# Patient Record
Sex: FEMALE | Race: BLACK OR AFRICAN AMERICAN | NOT HISPANIC OR LATINO | Employment: UNEMPLOYED | URBAN - METROPOLITAN AREA
[De-identification: names, ages, dates, MRNs, and addresses within clinical notes are randomized per-mention and may not be internally consistent; named-entity substitution may affect disease eponyms.]

---

## 2017-10-24 ENCOUNTER — HOSPITAL ENCOUNTER (EMERGENCY)
Facility: HOSPITAL | Age: 57
Discharge: HOME | End: 2017-10-25
Attending: EMERGENCY MEDICINE
Payer: MEDICAID

## 2017-10-24 DIAGNOSIS — N30.00 ACUTE CYSTITIS WITHOUT HEMATURIA: Primary | ICD-10-CM

## 2017-10-24 DIAGNOSIS — K42.9 UMBILICAL HERNIA WITHOUT OBSTRUCTION AND WITHOUT GANGRENE: ICD-10-CM

## 2017-10-24 LAB
ALBUMIN SERPL BCP-MCNC: 2.5 G/DL
ALP SERPL-CCNC: 100 U/L
ALT SERPL W/O P-5'-P-CCNC: 11 U/L
ANION GAP SERPL CALC-SCNC: 7 MMOL/L
AST SERPL-CCNC: 9 U/L
BASOPHILS # BLD AUTO: 0.02 K/UL
BASOPHILS NFR BLD: 0.2 %
BILIRUB SERPL-MCNC: 0.4 MG/DL
BUN SERPL-MCNC: 19 MG/DL
CALCIUM SERPL-MCNC: 9.3 MG/DL
CHLORIDE SERPL-SCNC: 106 MMOL/L
CO2 SERPL-SCNC: 28 MMOL/L
CREAT SERPL-MCNC: 1.2 MG/DL
DIFFERENTIAL METHOD: ABNORMAL
EOSINOPHIL # BLD AUTO: 0.2 K/UL
EOSINOPHIL NFR BLD: 2.3 %
ERYTHROCYTE [DISTWIDTH] IN BLOOD BY AUTOMATED COUNT: 14.7 %
EST. GFR  (AFRICAN AMERICAN): 59 ML/MIN/1.73 M^2
EST. GFR  (NON AFRICAN AMERICAN): 51 ML/MIN/1.73 M^2
GLUCOSE SERPL-MCNC: 98 MG/DL
HCT VFR BLD AUTO: 35.9 %
HGB BLD-MCNC: 11.6 G/DL
LIPASE SERPL-CCNC: 8 U/L
LYMPHOCYTES # BLD AUTO: 3.5 K/UL
LYMPHOCYTES NFR BLD: 35 %
MCH RBC QN AUTO: 28.7 PG
MCHC RBC AUTO-ENTMCNC: 32.3 G/DL
MCV RBC AUTO: 89 FL
MONOCYTES # BLD AUTO: 1 K/UL
MONOCYTES NFR BLD: 9.9 %
NEUTROPHILS # BLD AUTO: 5.2 K/UL
NEUTROPHILS NFR BLD: 52.4 %
PLATELET # BLD AUTO: 456 K/UL
PMV BLD AUTO: 10 FL
POTASSIUM SERPL-SCNC: 3.7 MMOL/L
PROT SERPL-MCNC: 8.2 G/DL
RBC # BLD AUTO: 4.04 M/UL
SODIUM SERPL-SCNC: 141 MMOL/L
WBC # BLD AUTO: 10 K/UL

## 2017-10-24 PROCEDURE — 81000 URINALYSIS NONAUTO W/SCOPE: CPT

## 2017-10-24 PROCEDURE — 85025 COMPLETE CBC W/AUTO DIFF WBC: CPT

## 2017-10-24 PROCEDURE — 63600175 PHARM REV CODE 636 W HCPCS: Performed by: EMERGENCY MEDICINE

## 2017-10-24 PROCEDURE — 87186 SC STD MICRODIL/AGAR DIL: CPT

## 2017-10-24 PROCEDURE — 99284 EMERGENCY DEPT VISIT MOD MDM: CPT | Mod: 25

## 2017-10-24 PROCEDURE — 80053 COMPREHEN METABOLIC PANEL: CPT

## 2017-10-24 PROCEDURE — 87077 CULTURE AEROBIC IDENTIFY: CPT

## 2017-10-24 PROCEDURE — 96374 THER/PROPH/DIAG INJ IV PUSH: CPT | Mod: 59

## 2017-10-24 PROCEDURE — 83690 ASSAY OF LIPASE: CPT

## 2017-10-24 PROCEDURE — 96375 TX/PRO/DX INJ NEW DRUG ADDON: CPT

## 2017-10-24 PROCEDURE — 87088 URINE BACTERIA CULTURE: CPT

## 2017-10-24 PROCEDURE — 87086 URINE CULTURE/COLONY COUNT: CPT

## 2017-10-24 PROCEDURE — 25500020 PHARM REV CODE 255: Performed by: EMERGENCY MEDICINE

## 2017-10-24 RX ORDER — MORPHINE SULFATE 10 MG/ML
5 INJECTION INTRAMUSCULAR; INTRAVENOUS; SUBCUTANEOUS ONCE
Status: COMPLETED | OUTPATIENT
Start: 2017-10-24 | End: 2017-10-24

## 2017-10-24 RX ADMIN — MORPHINE SULFATE 5 MG: 10 INJECTION INTRAVENOUS at 10:10

## 2017-10-24 RX ADMIN — IOHEXOL 15 ML: 300 INJECTION, SOLUTION INTRAVENOUS at 09:10

## 2017-10-24 RX ADMIN — IOHEXOL 70 ML: 350 INJECTION, SOLUTION INTRAVENOUS at 09:10

## 2017-10-25 VITALS
BODY MASS INDEX: 41.02 KG/M2 | HEART RATE: 98 BPM | OXYGEN SATURATION: 98 % | RESPIRATION RATE: 17 BRPM | DIASTOLIC BLOOD PRESSURE: 88 MMHG | WEIGHT: 293 LBS | SYSTOLIC BLOOD PRESSURE: 139 MMHG | HEIGHT: 71 IN

## 2017-10-25 LAB
BACTERIA #/AREA URNS HPF: ABNORMAL /HPF
BILIRUB UR QL STRIP: NEGATIVE
CLARITY UR: ABNORMAL
COLOR UR: YELLOW
GLUCOSE UR QL STRIP: NEGATIVE
HGB UR QL STRIP: ABNORMAL
KETONES UR QL STRIP: NEGATIVE
LEUKOCYTE ESTERASE UR QL STRIP: ABNORMAL
MICROSCOPIC COMMENT: ABNORMAL
NITRITE UR QL STRIP: NEGATIVE
PH UR STRIP: 5 [PH] (ref 5–8)
PROT UR QL STRIP: NEGATIVE
RBC #/AREA URNS HPF: 2 /HPF (ref 0–4)
SP GR UR STRIP: 1.03 (ref 1–1.03)
SQUAMOUS #/AREA URNS HPF: ABNORMAL /HPF
URN SPEC COLLECT METH UR: ABNORMAL
UROBILINOGEN UR STRIP-ACNC: ABNORMAL EU/DL
WBC #/AREA URNS HPF: >100 /HPF (ref 0–5)

## 2017-10-25 PROCEDURE — 25000003 PHARM REV CODE 250: Performed by: EMERGENCY MEDICINE

## 2017-10-25 PROCEDURE — 63600175 PHARM REV CODE 636 W HCPCS: Performed by: EMERGENCY MEDICINE

## 2017-10-25 RX ORDER — CEPHALEXIN 250 MG/1
500 CAPSULE ORAL
Status: COMPLETED | OUTPATIENT
Start: 2017-10-25 | End: 2017-10-25

## 2017-10-25 RX ORDER — CEPHALEXIN 500 MG/1
500 CAPSULE ORAL EVERY 8 HOURS
Qty: 21 CAPSULE | Refills: 0 | Status: SHIPPED | OUTPATIENT
Start: 2017-10-25 | End: 2017-11-01

## 2017-10-25 RX ORDER — ONDANSETRON 2 MG/ML
4 INJECTION INTRAMUSCULAR; INTRAVENOUS
Status: COMPLETED | OUTPATIENT
Start: 2017-10-25 | End: 2017-10-25

## 2017-10-25 RX ADMIN — CEPHALEXIN 500 MG: 250 CAPSULE ORAL at 12:10

## 2017-10-25 RX ADMIN — ONDANSETRON 4 MG: 2 INJECTION, SOLUTION INTRAMUSCULAR; INTRAVENOUS at 12:10

## 2017-10-25 NOTE — ED TRIAGE NOTES
Pt. Reports she has been having abd pain below her umbilicus. Pt. Tender on palpitation. Pt. Reports she has been hurting all over. She c/o emesis x3. Pt. Reports she has been having hard black stools today. Pain to abd. Is a 9/10 on the pain scale.

## 2017-10-25 NOTE — ED PROVIDER NOTES
"Encounter Date: 10/24/2017    SCRIBE #1 NOTE: I, Yeny Palmer, am scribing for, and in the presence of,  Valentin Carpenter MD. I have scribed the following portions of the note - Other sections scribed: HPI, ROS.       History     Chief Complaint   Patient presents with    Abdominal Pain     with emesis and reports "knot to abd". c/o dark stool as well.      CC: Abdominal Pain    HPI: 57 year old female with HTN and acid reflux presents to the ED c/o periumbilical abdominal pain that radiates to her back with associated nausea, vomiting (x 3), and hard, dark stool that worsened today. Pain is 9/10. Pain is exacerbated with movement. Pt otherwise denies fever, chills, chest pain, SOB, dysuria, and any other associated symptoms. No alleviating factors.      The history is provided by the patient. No  was used.     Review of patient's allergies indicates:  No Known Allergies  Past Medical History:   Diagnosis Date    Acid reflux     Arthritis     Hypertension      Past Surgical History:   Procedure Laterality Date    FRACTURE SURGERY      HYSTERECTOMY      TUBAL LIGATION       Family History   Problem Relation Age of Onset    Hypertension Brother      Social History   Substance Use Topics    Smoking status: Never Smoker    Smokeless tobacco: Never Used    Alcohol use No     Review of Systems   Constitutional: Negative for chills, diaphoresis and fever.   HENT: Negative for ear pain and sore throat.    Eyes: Negative for photophobia and visual disturbance.   Respiratory: Negative for cough and shortness of breath.    Cardiovascular: Negative for chest pain.   Gastrointestinal: Positive for abdominal pain (periumbilical), nausea and vomiting. Negative for diarrhea.        (+) hard, dark stool   Genitourinary: Negative for dysuria.   Musculoskeletal: Negative for back pain.   Skin: Negative for rash.   Neurological: Negative for headaches.       Physical Exam     Initial Vitals [10/24/17 " 2022]   BP Pulse Resp Temp SpO2   (!) 165/96 104 (!) 22 -- 98 %      MAP       119         Physical Exam    Vitals reviewed.  Constitutional: She appears well-developed and well-nourished.   HENT:   Head: Normocephalic and atraumatic.   Nose: Nose normal.   Mouth/Throat: No oropharyngeal exudate.   Eyes: EOM are normal. Pupils are equal, round, and reactive to light.   Neck: Normal range of motion. Neck supple. No JVD present.   Cardiovascular: Regular rhythm and normal heart sounds. Exam reveals no gallop and no friction rub.    No murmur heard.  Pulmonary/Chest: Breath sounds normal. No stridor. No respiratory distress. She has no wheezes. She has no rhonchi. She has no rales. She exhibits no tenderness.   Abdominal: Soft. Bowel sounds are normal. She exhibits no distension and no mass. There is tenderness in the suprapubic area. There is no rebound and no guarding. A hernia is present. Hernia confirmed positive in the ventral area.       Musculoskeletal: Normal range of motion. She exhibits no edema or tenderness.   Neurological: She is alert and oriented to person, place, and time. She has normal strength. No sensory deficit.   Skin: Skin is warm and dry.   Psychiatric: She has a normal mood and affect. Thought content normal.         ED Course   Procedures  Labs Reviewed   CBC W/ AUTO DIFFERENTIAL - Abnormal; Notable for the following:        Result Value    Hemoglobin 11.6 (*)     Hematocrit 35.9 (*)     RDW 14.7 (*)     Platelets 456 (*)     All other components within normal limits   COMPREHENSIVE METABOLIC PANEL - Abnormal; Notable for the following:     Albumin 2.5 (*)     AST 9 (*)     Anion Gap 7 (*)     eGFR if  59 (*)     eGFR if non  51 (*)     All other components within normal limits   URINALYSIS - Abnormal; Notable for the following:     Appearance, UA Hazy (*)     Occult Blood UA 1+ (*)     Urobilinogen, UA 4.0-6.0 (*)     Leukocytes, UA 2+ (*)     All other  components within normal limits   URINALYSIS MICROSCOPIC - Abnormal; Notable for the following:     WBC, UA >100 (*)     Bacteria, UA Many (*)     All other components within normal limits   CULTURE, URINE   LIPASE             Medical Decision Making:   History:   Old Medical Records: I decided to obtain old medical records.        This is an emergent evaluation of the patient complaining of abdominal pain.  My differential diagnosis includes mesenteric ischemia, obstruction, appendicitis, pancreatitis, cholecystitis, peptic ulcer disease,diverticulitis, colitis, volvulus, hernia, UTI, gastritis and gastroenteritis.    I decided to obtain and review the old medical record.    The patient does not clinically have gastroenteritis.  A CT scan was performed and was negative for mesenteric ischemia, obstruction, appendicitis, cholecystitis, diverticulitis, colitis.    Clinically the patient has a large umbilical hernia.  This hernia was identified on CT scan without evidence of obstruction or sacculation.  Patient can be referred to outpatient management with general surgery.    The patient's urine is infected.  A urine culture is pending.  Administered.  First dose of Keflex in the emergency department.  The patient's labs are otherwise normal.    In the emergency department the patient was treated with:   Medications   ondansetron injection 4 mg (not administered)   cephALEXin capsule 500 mg (not administered)   morphine injection 5 mg (5 mg Intravenous Given 10/24/17 2221)   omnipaque 300 iohexol 15 mL (15 mLs Oral Given 10/24/17 2159)   omnipaque 350 iohexol 70 mL (70 mLs Intravenous Given 10/24/17 2158)         The patient reports that symptomatically, symptoms have improved.    The patient will be discharged home with the following medications:  Patient will follow up with primary care physician.    The results and physical exam findings were reviewed with the patient. Pt agrees with assessment, disposition and  treatment plan and has no further questions or complaints at this time.      OLGA Carpenter M.D. 12:22 AM 10/25/2017           Scribe Attestation:   Scribe #1: I performed the above scribed service and the documentation accurately describes the services I performed. I attest to the accuracy of the note.    Attending Attestation:           Physician Attestation for Scribe:  Physician Attestation Statement for Scribe #1: I, Valentin Carpenter MD, reviewed documentation, as scribed by Yeny Palmer in my presence, and it is both accurate and complete.                 ED Course      Clinical Impression:   The primary encounter diagnosis was Acute cystitis without hematuria. A diagnosis of Umbilical hernia without obstruction and without gangrene was also pertinent to this visit.                           Valentin Carpenter MD  10/25/17 0023

## 2017-10-27 LAB — BACTERIA UR CULT: NORMAL

## 2017-12-12 ENCOUNTER — HOSPITAL ENCOUNTER (OUTPATIENT)
Dept: PREADMISSION TESTING | Facility: HOSPITAL | Age: 57
Discharge: HOME | End: 2017-12-12
Attending: SURGERY
Payer: MEDICAID

## 2017-12-12 ENCOUNTER — HOSPITAL ENCOUNTER (EMERGENCY)
Facility: HOSPITAL | Age: 57
Discharge: HOME | End: 2017-12-12
Payer: MEDICAID

## 2017-12-12 VITALS
BODY MASS INDEX: 43.4 KG/M2 | TEMPERATURE: 98 F | WEIGHT: 293 LBS | HEART RATE: 92 BPM | SYSTOLIC BLOOD PRESSURE: 138 MMHG | HEIGHT: 69 IN | OXYGEN SATURATION: 96 % | RESPIRATION RATE: 18 BRPM | DIASTOLIC BLOOD PRESSURE: 88 MMHG

## 2017-12-12 VITALS
RESPIRATION RATE: 22 BRPM | TEMPERATURE: 98 F | BODY MASS INDEX: 41.02 KG/M2 | DIASTOLIC BLOOD PRESSURE: 90 MMHG | HEART RATE: 102 BPM | SYSTOLIC BLOOD PRESSURE: 164 MMHG | WEIGHT: 293 LBS | HEIGHT: 71 IN | OXYGEN SATURATION: 98 %

## 2017-12-12 DIAGNOSIS — M54.50 LOW BACK PAIN: ICD-10-CM

## 2017-12-12 DIAGNOSIS — K42.9 UMBILICAL HERNIA WITHOUT OBSTRUCTION AND WITHOUT GANGRENE: ICD-10-CM

## 2017-12-12 DIAGNOSIS — Z01.818 PRE-OP TESTING: Primary | ICD-10-CM

## 2017-12-12 LAB
ANION GAP SERPL CALC-SCNC: 8 MMOL/L
BASOPHILS # BLD AUTO: 0.02 K/UL
BASOPHILS NFR BLD: 0.3 %
BUN SERPL-MCNC: 10 MG/DL
CALCIUM SERPL-MCNC: 9.6 MG/DL
CHLORIDE SERPL-SCNC: 106 MMOL/L
CO2 SERPL-SCNC: 26 MMOL/L
CREAT SERPL-MCNC: 1 MG/DL
DIFFERENTIAL METHOD: ABNORMAL
EOSINOPHIL # BLD AUTO: 0.2 K/UL
EOSINOPHIL NFR BLD: 3 %
ERYTHROCYTE [DISTWIDTH] IN BLOOD BY AUTOMATED COUNT: 15 %
EST. GFR  (AFRICAN AMERICAN): >60 ML/MIN/1.73 M^2
EST. GFR  (NON AFRICAN AMERICAN): >60 ML/MIN/1.73 M^2
GLUCOSE SERPL-MCNC: 95 MG/DL
HCT VFR BLD AUTO: 36.2 %
HGB BLD-MCNC: 11.7 G/DL
LYMPHOCYTES # BLD AUTO: 2.2 K/UL
LYMPHOCYTES NFR BLD: 30.8 %
MCH RBC QN AUTO: 28.5 PG
MCHC RBC AUTO-ENTMCNC: 32.3 G/DL
MCV RBC AUTO: 88 FL
MONOCYTES # BLD AUTO: 0.6 K/UL
MONOCYTES NFR BLD: 8.9 %
NEUTROPHILS # BLD AUTO: 4.1 K/UL
NEUTROPHILS NFR BLD: 57 %
PLATELET # BLD AUTO: 398 K/UL
PMV BLD AUTO: 9.1 FL
POTASSIUM SERPL-SCNC: 3.7 MMOL/L
RBC # BLD AUTO: 4.11 M/UL
SODIUM SERPL-SCNC: 140 MMOL/L
WBC # BLD AUTO: 7.1 K/UL

## 2017-12-12 PROCEDURE — 99283 EMERGENCY DEPT VISIT LOW MDM: CPT | Mod: 25

## 2017-12-12 PROCEDURE — 80048 BASIC METABOLIC PNL TOTAL CA: CPT

## 2017-12-12 PROCEDURE — 93010 ELECTROCARDIOGRAM REPORT: CPT | Mod: ,,, | Performed by: INTERNAL MEDICINE

## 2017-12-12 PROCEDURE — 63600175 PHARM REV CODE 636 W HCPCS: Performed by: NURSE PRACTITIONER

## 2017-12-12 PROCEDURE — 36415 COLL VENOUS BLD VENIPUNCTURE: CPT

## 2017-12-12 PROCEDURE — 25000003 PHARM REV CODE 250: Performed by: NURSE PRACTITIONER

## 2017-12-12 PROCEDURE — 85025 COMPLETE CBC W/AUTO DIFF WBC: CPT

## 2017-12-12 PROCEDURE — 96372 THER/PROPH/DIAG INJ SC/IM: CPT

## 2017-12-12 PROCEDURE — 93005 ELECTROCARDIOGRAM TRACING: CPT

## 2017-12-12 RX ORDER — METHOCARBAMOL 500 MG/1
500 TABLET, FILM COATED ORAL
Status: COMPLETED | OUTPATIENT
Start: 2017-12-12 | End: 2017-12-12

## 2017-12-12 RX ORDER — OXYCODONE AND ACETAMINOPHEN 5; 325 MG/1; MG/1
1 TABLET ORAL
Status: COMPLETED | OUTPATIENT
Start: 2017-12-12 | End: 2017-12-12

## 2017-12-12 RX ORDER — METHOCARBAMOL 500 MG/1
1000 TABLET, FILM COATED ORAL 3 TIMES DAILY
Qty: 20 TABLET | Refills: 0 | Status: SHIPPED | OUTPATIENT
Start: 2017-12-12 | End: 2017-12-17

## 2017-12-12 RX ORDER — KETOROLAC TROMETHAMINE 30 MG/ML
15 INJECTION, SOLUTION INTRAMUSCULAR; INTRAVENOUS
Status: COMPLETED | OUTPATIENT
Start: 2017-12-12 | End: 2017-12-12

## 2017-12-12 RX ORDER — LIDOCAINE 50 MG/G
1 PATCH TOPICAL DAILY
Qty: 5 PATCH | Refills: 0 | Status: SHIPPED | OUTPATIENT
Start: 2017-12-12

## 2017-12-12 RX ADMIN — OXYCODONE HYDROCHLORIDE AND ACETAMINOPHEN 1 TABLET: 5; 325 TABLET ORAL at 10:12

## 2017-12-12 RX ADMIN — METHOCARBAMOL 500 MG: 500 TABLET ORAL at 09:12

## 2017-12-12 RX ADMIN — KETOROLAC TROMETHAMINE 15 MG: 30 INJECTION, SOLUTION INTRAMUSCULAR at 09:12

## 2017-12-12 NOTE — DISCHARGE INSTRUCTIONS
Your surgery is scheduled for___12/19/2017______________.    Call [phone] between 2 pm and 5 pm ___12/18/2017_________ to find out your arrival time for the day of surgery.    Report to SAME DAY SURGERY UNIT at _______am on the 2nd floor of the hospital.  Use the front entrance of the hospital before 6 am.  If you need wheelchair assistance, call [phone] from your cell phone,  or call 0 from the courtesy phone in the hospital lobby.    Important instructions:   Do not eat or drink after 12 midnight, including water.  It is okay to brush your teeth.  Do not have gum, candy or mints.     Take only these medications with a small swallow of water on the morning of your surgery.___none__________       Please shower the night before and the morning of your surgery.       Use Hibiclens soap to your surgery site if instructed by your pre op nurse.   If your surgery is on your abdomen, be sure to wash your naval.  Be sure to rinse off Hibiclens after it is on your skin for several minutes.  Do not use Hibiclens on your face or genitals.      Do not wear make- up, including mascara.     You may wear deodorant only.      Do not wear powder, body lotion or cologne.     Do not wear any jewelry or have any metal on your body.     Please bring any documents given to you by your doctor.     If you are going home on the same day of surgery, you must have arrangements for a ride home.  You will not be able to drive home if you were given anesthesia or sedation.     Stop taking Aspirin, Ibuprofen, Motrin and Aleve at least 7 days before your surgery. You may use Tylenol.     Stop taking fish oil and vitamin E for least 7 days before surgery.     Wear loose fitting clothes allowing for bandages.     Please leave money and valuables home.       You may bring your cell phone.     Call the doctor if fever or illness should occur before your surgery.    Call [phone] to contact us here at [facility] if needed.

## 2017-12-13 NOTE — DISCHARGE INSTRUCTIONS
You have been prescribed ROBAXIN for pain. Please do not take this medication while working, drinking alcohol, swimming, or while driving/operating heavy machinery. This medication may cause drowsiness, impair judgment, and reduce physical capabilities.    Please return to the Emergency Department for any new or worsening symptoms including: worsening pain, bowel or bladder incontinence, numbness or tingling in your groin, fever, chest pain, shortness of breath, loss of consciousness, dizziness, weakness, or any other concerns.     Please follow up with your Primary Care Provider within in the week. If you do not have one, you may contact the one listed on your discharge paperwork or you may also call the [facility] Appointment Desk at [phone] to schedule an appointment with one.     Please take all medication as prescribed.

## 2017-12-13 NOTE — ED PROVIDER NOTES
"Encounter Date: 12/12/2017       History     Chief Complaint   Patient presents with    Leg Pain     R leg pain "I'm hurting from my buttocks all the way to my ankle." Started Friday.      CC: Leg pain    HPI: Patient is a 57-year-old morbidly obese female who presents today her daughter for right leg pain since Friday.  The pain starts at her right low back and radiates down to her ankle.  The pain is constant, rated 10 out of 10 on pain scale.  She has tried ice which has not helped with the pain.  She denies any known trauma or falls.  She does report history of arthritis for which she takes Mobic daily.  She uses a cane and walker to ambulate.  Denies history of blood clots, stroke, recent long car or plane rides, recent surgeries, hormonal use.  She denies any bowel or bladder incontinence, numbness or tingling to her groin, fever, chills, shortness of breath, chest pain, abdominal pain, dysuria, increased urinary urgency or frequency.       The history is provided by the patient. No  was used.     Review of patient's allergies indicates:  No Known Allergies  Past Medical History:   Diagnosis Date    Acid reflux     Arthritis     Hypertension     Morbid obesity      Past Surgical History:   Procedure Laterality Date    FRACTURE SURGERY      HYSTERECTOMY      TUBAL LIGATION       Family History   Problem Relation Age of Onset    Hypertension Brother      Social History   Substance Use Topics    Smoking status: Never Smoker    Smokeless tobacco: Never Used    Alcohol use No     Review of Systems   Constitutional: Negative for chills and fever.   HENT: Negative for sore throat.    Respiratory: Negative for cough, choking, chest tightness, shortness of breath and wheezing.    Cardiovascular: Negative for chest pain, palpitations and leg swelling.   Gastrointestinal: Negative for abdominal distention, abdominal pain, diarrhea, nausea and vomiting.   Genitourinary: Negative for " decreased urine volume, difficulty urinating, dysuria, flank pain, frequency, pelvic pain and urgency.   Musculoskeletal: Positive for arthralgias, back pain, gait problem and myalgias. Negative for joint swelling, neck pain and neck stiffness.   Skin: Negative for rash and wound.   Neurological: Negative for dizziness, weakness, light-headedness, numbness and headaches.   Hematological: Does not bruise/bleed easily.   Psychiatric/Behavioral: Negative for confusion.       Physical Exam     Initial Vitals [12/12/17 1945]   BP Pulse Resp Temp SpO2   (!) 141/85 97 20 97.3 °F (36.3 °C) 96 %      MAP       103.67         Physical Exam    Constitutional: She appears well-developed and well-nourished. She is not diaphoretic. She is Obese . No distress.   HENT:   Head: Normocephalic and atraumatic.   Neck: Normal range of motion and full passive range of motion without pain. Neck supple. No spinous process tenderness and no muscular tenderness present. No neck rigidity.   Cardiovascular: Normal rate, regular rhythm, normal heart sounds and intact distal pulses. Exam reveals no gallop and no friction rub.    No murmur heard.  Pulmonary/Chest: Effort normal and breath sounds normal. No respiratory distress. She has no wheezes. She has no rhonchi. She has no rales. She exhibits no tenderness.   Abdominal: Soft. Normal appearance and bowel sounds are normal. There is no hepatosplenomegaly. There is no tenderness. There is no rigidity, no rebound, no guarding, no CVA tenderness, no tenderness at McBurney's point and negative Fajardo's sign.   Musculoskeletal: Normal range of motion.        Right hip: Normal. She exhibits normal range of motion, normal strength, no tenderness, no bony tenderness, no swelling, no crepitus, no deformity and no laceration.        Right knee: Normal. She exhibits normal range of motion, no swelling, no effusion, no ecchymosis, no deformity, no laceration, no erythema, normal alignment, no LCL laxity,  normal patellar mobility, no bony tenderness, normal meniscus and no MCL laxity. No tenderness found.        Right ankle: Normal. She exhibits normal range of motion, no swelling, no ecchymosis, no deformity, no laceration and normal pulse. No tenderness.        Lumbar back: Normal. She exhibits normal range of motion, no tenderness, no bony tenderness, no swelling, no edema, no deformity, no laceration, no pain, no spasm and normal pulse.        Right upper leg: She exhibits no tenderness, no bony tenderness, no swelling, no edema, no deformity and no laceration.        Right lower leg: Normal. She exhibits no tenderness, no bony tenderness, no swelling, no edema, no deformity and no laceration.   Neurological: She is alert and oriented to person, place, and time.   Skin: Skin is warm and dry.   Psychiatric: She has a normal mood and affect. Her behavior is normal.         ED Course   Procedures  Labs Reviewed - No data to display          Medical Decision Making:   ED Management:  This is an evaluation of a 57 y.o. female that presents to the Emergency Department for back pain.  Denies fever, rash, night sweats, weight loss, dysuria, bowel/bladder incontinence, or IV\SQ drug use. The patient is a non-toxic, afebrile, and well appearing female.  She is morbidly obese. On physical exam, there is no muscle tenderness to palpation, no mass or spasm, no point tenderness, step-off or deformity of the bony cervical, thoracic, or lumbar sacral spine to firm palpation at the midline. Patient has normal flexion, extension, lateral bending and rotation without limitation or pain. Dorsi and plantarflexion with adequate strength.  Patellar reflexes are equal and brisk bilaterally. The right leg is without deformity or asymmetry when compared to the left.  There is no soft tissue swelling or edema.  No overlying erythema, warmth, discoloration.  No lesions or break in skin integrity.  Soft tissues of the posterior lower leg are  soft, supple, nontender with no palpable cords or evidence of thrombophlebitis.  No evidence of compartment syndrome.  Medial thigh is without soft tissue swelling or tenderness to palpation.  There is no abdominal pain to palpation, CVA tenderness, saddle anesthesia. Reflexes and sensation are symmetric bilaterally. Dorsalis pedis pulses are symmetrical.     Vital signs reassuring. RESULTS:   X-ray of the lumbar spine AP and lateral disc space height loss and endplate degenerative changes involving L4-L5 and L5-S1.  He is multilevel facet arthropathy merrily involving L3-S1.  No acute displaced fracture or dislocation of the lumbar spine.    Patient reported some pain relief after Toradol, Robaxin, and Percocet administration.  I believe it is appropriate time to discharge patient home with a short course of muscle relaxer and lidocaine patches until she can follow up with orthopedics or her primary care doctor.      Given the above findings, my overall impression is back pain and right leg pain.  I believe this right leg pain is sciatic in nature. Given the above findings, I do not think the patient has acute vertebral fracture, subluxation, dislocation, epidural abscess, cauda equina, shingles, DVT, or UTI.    ED Course: Toradol, Robaxin, Percocet. D/C Meds: Robaxin, lidocaine patch. Additional D/C Information: Rice therapy. The diagnosis, treatment plan, instructions for follow-up and reevaluation with PCP, ORTHO as well as ED return precautions were discussed and understanding was verbalized. All questions or concerns have been addressed.     This case was discussed with Dr. Collazo who is in agreement with my assessment and plan.   .    Additional MDM:     Well's Criteria Score:  -Clinical symptoms of DVT (leg swelling, pain with palpation) = 0.0  -Other diagnosis less likely than pulmonary embolism =            0.0  -Heart Rate >100 =   0.0  -Immobilization (= or > than 3 days) or surgery in the previous 4  weeks = 0.0  -Previous DVT/PE = 0.0  -Hemoptysis =          0.0  -Malignancy =           0.0  Well's Probability Score =    0                      ED Course      Clinical Impression:   The encounter diagnosis was Low back pain.    Disposition:   Disposition: Discharged  Condition: Stable                        Sonali Cox NP  12/12/17 9240

## 2017-12-13 NOTE — ED TRIAGE NOTES
Pt reports pain from Rt buttocks down to the ankle.  Pt reports pain began last Friday.  Denies any trauma to the RLE.  Pt rates pain as 10.

## 2017-12-19 ENCOUNTER — SURGERY (OUTPATIENT)
Age: 57
End: 2017-12-19

## 2017-12-19 ENCOUNTER — HOSPITAL ENCOUNTER (OUTPATIENT)
Facility: HOSPITAL | Age: 57
Discharge: HOME | End: 2017-12-19
Attending: SURGERY | Admitting: SURGERY
Payer: MEDICAID

## 2017-12-19 ENCOUNTER — ANESTHESIA EVENT (OUTPATIENT)
Dept: SURGERY | Facility: HOSPITAL | Age: 57
End: 2017-12-19
Payer: MEDICAID

## 2017-12-19 ENCOUNTER — ANESTHESIA (OUTPATIENT)
Dept: SURGERY | Facility: HOSPITAL | Age: 57
End: 2017-12-19
Payer: MEDICAID

## 2017-12-19 VITALS
OXYGEN SATURATION: 98 % | TEMPERATURE: 97 F | DIASTOLIC BLOOD PRESSURE: 70 MMHG | HEART RATE: 85 BPM | WEIGHT: 293 LBS | RESPIRATION RATE: 14 BRPM | SYSTOLIC BLOOD PRESSURE: 162 MMHG | HEIGHT: 71 IN | BODY MASS INDEX: 41.02 KG/M2

## 2017-12-19 DIAGNOSIS — K42.9 UMBILICAL HERNIA WITHOUT OBSTRUCTION AND WITHOUT GANGRENE: Primary | ICD-10-CM

## 2017-12-19 LAB — POCT GLUCOSE: 84 MG/DL (ref 70–110)

## 2017-12-19 PROCEDURE — 25000003 PHARM REV CODE 250: Performed by: SURGERY

## 2017-12-19 PROCEDURE — 88302 TISSUE EXAM BY PATHOLOGIST: CPT | Mod: 26,,, | Performed by: PATHOLOGY

## 2017-12-19 PROCEDURE — 25000003 PHARM REV CODE 250: Performed by: ANESTHESIOLOGY

## 2017-12-19 PROCEDURE — 36000707: Performed by: SURGERY

## 2017-12-19 PROCEDURE — 25000242 PHARM REV CODE 250 ALT 637 W/ HCPCS

## 2017-12-19 PROCEDURE — 27201423 OPTIME MED/SURG SUP & DEVICES STERILE SUPPLY: Performed by: SURGERY

## 2017-12-19 PROCEDURE — D9220A PRA ANESTHESIA: Mod: CRNA,,, | Performed by: NURSE ANESTHETIST, CERTIFIED REGISTERED

## 2017-12-19 PROCEDURE — 36000706: Performed by: SURGERY

## 2017-12-19 PROCEDURE — 71000016 HC POSTOP RECOV ADDL HR: Performed by: SURGERY

## 2017-12-19 PROCEDURE — D9220A PRA ANESTHESIA: Mod: ANES,,, | Performed by: ANESTHESIOLOGY

## 2017-12-19 PROCEDURE — 88302 TISSUE EXAM BY PATHOLOGIST: CPT | Performed by: PATHOLOGY

## 2017-12-19 PROCEDURE — 63600175 PHARM REV CODE 636 W HCPCS: Performed by: ANESTHESIOLOGY

## 2017-12-19 PROCEDURE — C9290 INJ, BUPIVACAINE LIPOSOME: HCPCS | Performed by: SURGERY

## 2017-12-19 PROCEDURE — 63600175 PHARM REV CODE 636 W HCPCS: Performed by: SURGERY

## 2017-12-19 PROCEDURE — 71000039 HC RECOVERY, EACH ADD'L HOUR: Performed by: SURGERY

## 2017-12-19 PROCEDURE — 25000003 PHARM REV CODE 250

## 2017-12-19 PROCEDURE — 37000008 HC ANESTHESIA 1ST 15 MINUTES: Performed by: SURGERY

## 2017-12-19 PROCEDURE — S0028 INJECTION, FAMOTIDINE, 20 MG: HCPCS

## 2017-12-19 PROCEDURE — 88304 TISSUE EXAM BY PATHOLOGIST: CPT | Mod: 26,,, | Performed by: PATHOLOGY

## 2017-12-19 PROCEDURE — S0020 INJECTION, BUPIVICAINE HYDRO: HCPCS | Performed by: SURGERY

## 2017-12-19 PROCEDURE — 71000015 HC POSTOP RECOV 1ST HR: Performed by: SURGERY

## 2017-12-19 PROCEDURE — 71000033 HC RECOVERY, INTIAL HOUR: Performed by: SURGERY

## 2017-12-19 PROCEDURE — 88304 TISSUE EXAM BY PATHOLOGIST: CPT | Performed by: PATHOLOGY

## 2017-12-19 PROCEDURE — 63600175 PHARM REV CODE 636 W HCPCS

## 2017-12-19 PROCEDURE — 37000009 HC ANESTHESIA EA ADD 15 MINS: Performed by: SURGERY

## 2017-12-19 RX ORDER — LABETALOL HYDROCHLORIDE 5 MG/ML
INJECTION, SOLUTION INTRAVENOUS
Status: DISCONTINUED | OUTPATIENT
Start: 2017-12-19 | End: 2017-12-19

## 2017-12-19 RX ORDER — HYDROCODONE BITARTRATE AND ACETAMINOPHEN 5; 325 MG/1; MG/1
1 TABLET ORAL EVERY 4 HOURS PRN
Status: DISCONTINUED | OUTPATIENT
Start: 2017-12-19 | End: 2017-12-19 | Stop reason: HOSPADM

## 2017-12-19 RX ORDER — OXYCODONE AND ACETAMINOPHEN 5; 325 MG/1; MG/1
1 TABLET ORAL EVERY 4 HOURS PRN
Qty: 30 TABLET | Refills: 0 | Status: SHIPPED | OUTPATIENT
Start: 2017-12-19 | End: 2017-12-27

## 2017-12-19 RX ORDER — MORPHINE SULFATE 10 MG/ML
3 INJECTION INTRAMUSCULAR; INTRAVENOUS; SUBCUTANEOUS
Status: DISCONTINUED | OUTPATIENT
Start: 2017-12-19 | End: 2017-12-19 | Stop reason: HOSPADM

## 2017-12-19 RX ORDER — SODIUM CHLORIDE 9 MG/ML
INJECTION, SOLUTION INTRAVENOUS CONTINUOUS
Status: DISCONTINUED | OUTPATIENT
Start: 2017-12-19 | End: 2017-12-19 | Stop reason: SDUPTHER

## 2017-12-19 RX ORDER — MUPIROCIN 20 MG/G
OINTMENT TOPICAL
Status: DISCONTINUED | OUTPATIENT
Start: 2017-12-19 | End: 2017-12-19 | Stop reason: HOSPADM

## 2017-12-19 RX ORDER — FENTANYL CITRATE 50 UG/ML
25 INJECTION, SOLUTION INTRAMUSCULAR; INTRAVENOUS EVERY 5 MIN PRN
Status: DISCONTINUED | OUTPATIENT
Start: 2017-12-19 | End: 2017-12-19 | Stop reason: HOSPADM

## 2017-12-19 RX ORDER — SODIUM CHLORIDE 9 MG/ML
INJECTION, SOLUTION INTRAVENOUS CONTINUOUS
Status: DISCONTINUED | OUTPATIENT
Start: 2017-12-19 | End: 2017-12-19 | Stop reason: HOSPADM

## 2017-12-19 RX ORDER — ROCURONIUM BROMIDE 10 MG/ML
INJECTION, SOLUTION INTRAVENOUS
Status: DISCONTINUED | OUTPATIENT
Start: 2017-12-19 | End: 2017-12-19

## 2017-12-19 RX ORDER — ALBUTEROL SULFATE 90 UG/1
AEROSOL, METERED RESPIRATORY (INHALATION)
Status: DISCONTINUED | OUTPATIENT
Start: 2017-12-19 | End: 2017-12-19

## 2017-12-19 RX ORDER — ACETAMINOPHEN 10 MG/ML
1000 INJECTION, SOLUTION INTRAVENOUS ONCE
Status: DISCONTINUED | OUTPATIENT
Start: 2017-12-19 | End: 2017-12-19 | Stop reason: SDUPTHER

## 2017-12-19 RX ORDER — METOCLOPRAMIDE HYDROCHLORIDE 5 MG/ML
INJECTION INTRAMUSCULAR; INTRAVENOUS
Status: DISCONTINUED | OUTPATIENT
Start: 2017-12-19 | End: 2017-12-19

## 2017-12-19 RX ORDER — GLYCOPYRROLATE 0.2 MG/ML
INJECTION INTRAMUSCULAR; INTRAVENOUS
Status: DISCONTINUED | OUTPATIENT
Start: 2017-12-19 | End: 2017-12-19

## 2017-12-19 RX ORDER — LIDOCAINE HYDROCHLORIDE 10 MG/ML
1 INJECTION, SOLUTION EPIDURAL; INFILTRATION; INTRACAUDAL; PERINEURAL ONCE
Status: DISCONTINUED | OUTPATIENT
Start: 2017-12-19 | End: 2017-12-19 | Stop reason: HOSPADM

## 2017-12-19 RX ORDER — FAMOTIDINE 10 MG/ML
INJECTION INTRAVENOUS
Status: DISCONTINUED | OUTPATIENT
Start: 2017-12-19 | End: 2017-12-19

## 2017-12-19 RX ORDER — ONDANSETRON 2 MG/ML
4 INJECTION INTRAMUSCULAR; INTRAVENOUS EVERY 6 HOURS PRN
Status: DISCONTINUED | OUTPATIENT
Start: 2017-12-19 | End: 2017-12-19 | Stop reason: HOSPADM

## 2017-12-19 RX ORDER — ACETAMINOPHEN 10 MG/ML
1000 INJECTION, SOLUTION INTRAVENOUS ONCE
Status: DISCONTINUED | OUTPATIENT
Start: 2017-12-19 | End: 2017-12-19 | Stop reason: HOSPADM

## 2017-12-19 RX ORDER — BUPIVACAINE HYDROCHLORIDE 2.5 MG/ML
INJECTION, SOLUTION INFILTRATION; PERINEURAL
Status: DISCONTINUED | OUTPATIENT
Start: 2017-12-19 | End: 2017-12-19 | Stop reason: HOSPADM

## 2017-12-19 RX ORDER — MIDAZOLAM HYDROCHLORIDE 1 MG/ML
INJECTION, SOLUTION INTRAMUSCULAR; INTRAVENOUS
Status: DISCONTINUED | OUTPATIENT
Start: 2017-12-19 | End: 2017-12-19

## 2017-12-19 RX ORDER — HYDROCODONE BITARTRATE AND ACETAMINOPHEN 5; 325 MG/1; MG/1
1 TABLET ORAL EVERY 4 HOURS PRN
Status: DISCONTINUED | OUTPATIENT
Start: 2017-12-19 | End: 2017-12-19 | Stop reason: SDUPTHER

## 2017-12-19 RX ORDER — MORPHINE SULFATE 10 MG/ML
3 INJECTION INTRAMUSCULAR; INTRAVENOUS; SUBCUTANEOUS
Status: DISCONTINUED | OUTPATIENT
Start: 2017-12-19 | End: 2017-12-19 | Stop reason: SDUPTHER

## 2017-12-19 RX ORDER — NEOSTIGMINE METHYLSULFATE 1 MG/ML
INJECTION, SOLUTION INTRAVENOUS
Status: DISCONTINUED | OUTPATIENT
Start: 2017-12-19 | End: 2017-12-19

## 2017-12-19 RX ORDER — ACETAMINOPHEN 10 MG/ML
INJECTION, SOLUTION INTRAVENOUS
Status: DISCONTINUED | OUTPATIENT
Start: 2017-12-19 | End: 2017-12-19

## 2017-12-19 RX ORDER — ONDANSETRON 2 MG/ML
INJECTION INTRAMUSCULAR; INTRAVENOUS
Status: DISCONTINUED | OUTPATIENT
Start: 2017-12-19 | End: 2017-12-19

## 2017-12-19 RX ORDER — FENTANYL CITRATE 50 UG/ML
INJECTION, SOLUTION INTRAMUSCULAR; INTRAVENOUS
Status: DISCONTINUED | OUTPATIENT
Start: 2017-12-19 | End: 2017-12-19

## 2017-12-19 RX ORDER — HYDROMORPHONE HYDROCHLORIDE 2 MG/ML
0.2 INJECTION, SOLUTION INTRAMUSCULAR; INTRAVENOUS; SUBCUTANEOUS EVERY 5 MIN PRN
Status: DISCONTINUED | OUTPATIENT
Start: 2017-12-19 | End: 2017-12-19 | Stop reason: HOSPADM

## 2017-12-19 RX ORDER — PROPOFOL 10 MG/ML
VIAL (ML) INTRAVENOUS
Status: DISCONTINUED | OUTPATIENT
Start: 2017-12-19 | End: 2017-12-19

## 2017-12-19 RX ORDER — MORPHINE SULFATE 10 MG/ML
2 INJECTION INTRAMUSCULAR; INTRAVENOUS; SUBCUTANEOUS EVERY 5 MIN PRN
Status: DISCONTINUED | OUTPATIENT
Start: 2017-12-19 | End: 2017-12-19 | Stop reason: HOSPADM

## 2017-12-19 RX ORDER — SUCCINYLCHOLINE CHLORIDE 20 MG/ML
INJECTION INTRAMUSCULAR; INTRAVENOUS
Status: DISCONTINUED | OUTPATIENT
Start: 2017-12-19 | End: 2017-12-19

## 2017-12-19 RX ORDER — SODIUM CHLORIDE, SODIUM LACTATE, POTASSIUM CHLORIDE, CALCIUM CHLORIDE 600; 310; 30; 20 MG/100ML; MG/100ML; MG/100ML; MG/100ML
INJECTION, SOLUTION INTRAVENOUS CONTINUOUS
Status: DISCONTINUED | OUTPATIENT
Start: 2017-12-19 | End: 2017-12-19 | Stop reason: HOSPADM

## 2017-12-19 RX ORDER — SODIUM CHLORIDE 0.9 % (FLUSH) 0.9 %
3 SYRINGE (ML) INJECTION
Status: DISCONTINUED | OUTPATIENT
Start: 2017-12-19 | End: 2017-12-19 | Stop reason: HOSPADM

## 2017-12-19 RX ORDER — LIDOCAINE HCL/PF 100 MG/5ML
SYRINGE (ML) INTRAVENOUS
Status: DISCONTINUED | OUTPATIENT
Start: 2017-12-19 | End: 2017-12-19

## 2017-12-19 RX ADMIN — MORPHINE SULFATE 2 MG: 10 INJECTION INTRAVENOUS at 10:12

## 2017-12-19 RX ADMIN — NEOSTIGMINE METHYLSULFATE 5 MG: 1 INJECTION INTRAVENOUS at 09:12

## 2017-12-19 RX ADMIN — LIDOCAINE HYDROCHLORIDE 100 MG: 20 INJECTION, SOLUTION INTRAVENOUS at 08:12

## 2017-12-19 RX ADMIN — ROCURONIUM BROMIDE 10 MG: 10 INJECTION, SOLUTION INTRAVENOUS at 08:12

## 2017-12-19 RX ADMIN — BUPIVACAINE 10 ML: 13.3 INJECTION, SUSPENSION, LIPOSOMAL INFILTRATION at 08:12

## 2017-12-19 RX ADMIN — BUPIVACAINE 10 ML: 13.3 INJECTION, SUSPENSION, LIPOSOMAL INFILTRATION at 09:12

## 2017-12-19 RX ADMIN — MORPHINE SULFATE 2 MG: 10 INJECTION INTRAVENOUS at 11:12

## 2017-12-19 RX ADMIN — ACETAMINOPHEN 1000 MG: 10 INJECTION, SOLUTION INTRAVENOUS at 08:12

## 2017-12-19 RX ADMIN — ALBUTEROL SULFATE 4 PUFF: 90 AEROSOL, METERED RESPIRATORY (INHALATION) at 09:12

## 2017-12-19 RX ADMIN — SUGAMMADEX 367 MG: 100 INJECTION, SOLUTION INTRAVENOUS at 10:12

## 2017-12-19 RX ADMIN — GLYCOPYRROLATE 0.6 MG: 0.2 INJECTION, SOLUTION INTRAMUSCULAR; INTRAVENOUS at 09:12

## 2017-12-19 RX ADMIN — ROCURONIUM BROMIDE 20 MG: 10 INJECTION, SOLUTION INTRAVENOUS at 08:12

## 2017-12-19 RX ADMIN — MIDAZOLAM HYDROCHLORIDE 2 MG: 1 INJECTION, SOLUTION INTRAMUSCULAR; INTRAVENOUS at 10:12

## 2017-12-19 RX ADMIN — FENTANYL CITRATE 50 MCG: 50 INJECTION INTRAMUSCULAR; INTRAVENOUS at 08:12

## 2017-12-19 RX ADMIN — MIDAZOLAM HYDROCHLORIDE 2 MG: 1 INJECTION, SOLUTION INTRAMUSCULAR; INTRAVENOUS at 08:12

## 2017-12-19 RX ADMIN — METOCLOPRAMIDE 10 MG: 5 INJECTION, SOLUTION INTRAMUSCULAR; INTRAVENOUS at 08:12

## 2017-12-19 RX ADMIN — GLYCOPYRROLATE 0.2 MG: 0.2 INJECTION, SOLUTION INTRAMUSCULAR; INTRAVENOUS at 08:12

## 2017-12-19 RX ADMIN — SODIUM CHLORIDE, SODIUM LACTATE, POTASSIUM CHLORIDE, AND CALCIUM CHLORIDE: 600; 310; 30; 20 INJECTION, SOLUTION INTRAVENOUS at 09:12

## 2017-12-19 RX ADMIN — FAMOTIDINE 20 MG: 10 INJECTION, SOLUTION INTRAVENOUS at 08:12

## 2017-12-19 RX ADMIN — ONDANSETRON 4 MG: 2 INJECTION, SOLUTION INTRAMUSCULAR; INTRAVENOUS at 08:12

## 2017-12-19 RX ADMIN — SODIUM CHLORIDE, SODIUM LACTATE, POTASSIUM CHLORIDE, AND CALCIUM CHLORIDE 10 ML/HR: 600; 310; 30; 20 INJECTION, SOLUTION INTRAVENOUS at 07:12

## 2017-12-19 RX ADMIN — PROPOFOL 200 MG: 10 INJECTION, EMULSION INTRAVENOUS at 08:12

## 2017-12-19 RX ADMIN — FENTANYL CITRATE 100 MCG: 50 INJECTION INTRAMUSCULAR; INTRAVENOUS at 08:12

## 2017-12-19 RX ADMIN — MUPIROCIN: 20 OINTMENT TOPICAL at 07:12

## 2017-12-19 RX ADMIN — LABETALOL HYDROCHLORIDE 10 MG: 5 INJECTION, SOLUTION INTRAVENOUS at 10:12

## 2017-12-19 RX ADMIN — BUPIVACAINE HYDROCHLORIDE 20 ML: 2.5 INJECTION, SOLUTION INFILTRATION; PERINEURAL at 08:12

## 2017-12-19 RX ADMIN — SUCCINYLCHOLINE CHLORIDE 160 MG: 20 INJECTION, SOLUTION INTRAMUSCULAR; INTRAVENOUS at 08:12

## 2017-12-19 RX ADMIN — BUPIVACAINE HYDROCHLORIDE 30 ML: 2.5 INJECTION, SOLUTION INFILTRATION; PERINEURAL at 09:12

## 2017-12-19 RX ADMIN — CEFAZOLIN SODIUM 3 G: 1 POWDER, FOR SOLUTION INTRAMUSCULAR; INTRAVENOUS at 08:12

## 2017-12-19 NOTE — OP NOTE
DATE OF PROCEDURE:  12/19/2017    PREOPERATIVE DIAGNOSIS:  Umbilical hernia.    POSTOPERATIVE DIAGNOSIS:  Umbilical hernia.    PROCEDURE PERFORMED:  Umbilical hernia repair.    SURGEON:  Lenin Shrestha M.D.    ASSISTANT:  Gera Meza M.D.    ANESTHESIA:  General.    ESTIMATED BLOOD LOSS:  Minimal.    DESCRIPTION OF OPERATION:  The patient was brought to the Operating Room, placed   on the Operating Room table in supine position.  Under adequate anesthesia,   prepped and draped around her abdomen in the usual sterile fashion.  Incision   was made just beneath her umbilicus through which the large hernia sac was   identified, circumferentially dissected.  The hernia sac was opened, there was a   piece of colon in there as well as some omentum.  The omentum was divided and   removed to allow reduction with the hernia sac.  Once this was done, some   Novafil was used in this space and the wound was then closed in layers using an   interrupted open repair Smead-Hodge technique with #1 Ethibond.  Once this was   done, the rest of the wound was closed in layers with absorbable suture.  X-ray   was used, Stratafix, Prineo tape as well as a binder.  The patient was awakened   and transported to the Recovery Room in satisfactory condition.      RADHA/IN  dd: 12/19/2017 09:28:25 (CST)  td: 12/19/2017 10:49:40 (CST)  Doc ID   #[id number]  Job ID #[id number]    CC:

## 2017-12-19 NOTE — H&P
Updated H&P    Patient ID: Theresa Benson is a 57 y.o. female.     Chief Complaint: Umbilical Hernia     HPI 56 yo female with umbilical hernia that is increasing in symptoms and size. She presents today for elective repair.    Review of Systems   Constitutional: Negative.    HENT: Negative.    Eyes: Negative.    Respiratory: Negative.    Cardiovascular: Negative.    Gastrointestinal: Negative.    Endocrine: Negative.    Musculoskeletal: Negative.    Skin: Negative.    Allergic/Immunologic: Negative.    Neurological: Negative.    Hematological: Negative.    Psychiatric/Behavioral: Negative.    All other systems reviewed and are negative.      Objective:   Physical Exam   Constitutional: She is oriented to person, place, and time. She appears well-developed and well-nourished.   HENT:   Head: Normocephalic and atraumatic.   Right Ear: External ear normal.   Left Ear: External ear normal.   Nose: Nose normal.   Mouth/Throat: Oropharynx is clear and moist.   Eyes: Conjunctivae and EOM are normal. Pupils are equal, round, and reactive to light.   Neck: Normal range of motion. Neck supple.   Cardiovascular: Normal rate, regular rhythm, normal heart sounds and intact distal pulses.    Pulmonary/Chest: Effort normal and breath sounds normal.   Abdominal: Soft. Bowel sounds are normal. A chronically incarcerated umbilical hernia is present.   Musculoskeletal: Normal range of motion.   Neurological: She is alert and oriented to person, place, and time. She has normal reflexes.   Skin: Skin is warm and dry.   Psychiatric: She has a normal mood and affect. Her behavior is normal. Thought content normal.     Vitals reviewed.      Assessment:       1. Umbilical hernia without obstruction and without gangrene        Plan:       To the OR for incisional hernia repair.  The risks were explained and she agrees to proceed as planned

## 2017-12-19 NOTE — BRIEF OP NOTE
[facility]  Brief Operative Note     SUMMARY     Surgery Date: 12/19/2017     Surgeon(s) and Role:     * Gianna Meza MD - Resident - Assisting     * Lenin Shrestha MD - Primary        Pre-op Diagnosis:  Umbilical hernia without obstruction and without gangrene [K42.9]    Post-op Diagnosis:  Post-Op Diagnosis Codes:     * Umbilical hernia without obstruction and without gangrene [K42.9]    Procedure(s) (LRB):  REPAIR-HERNIA-UMBILICAL (5 YRS +)-OPEN (N/A)    Anesthesia: General    Description of the findings of the procedure: 2X3 cm defect with omentum and transverse colon within sac    Findings/Key Components: same    Estimated Blood Loss: * No values recorded between 12/19/2017  8:45 AM and 12/19/2017  9:24 AM *         Specimens:   Specimen (12h ago through future)    Start     Ordered    12/19/17 0853  Specimen to Pathology - Surgery  Once     Comments:  Hernia Sac- Umbilical; Omentum      12/19/17 0919          Discharge Note    SUMMARY     Admit Date: 12/19/2017    Discharge Date and Time:  12/19/2017 9:25 AM    Hospital Course (synopsis of major diagnoses, care, treatment, and services provided during the course of the hospital stay): uneventful post op course     Final Diagnosis: Post-Op Diagnosis Codes:     * Umbilical hernia without obstruction and without gangrene [K42.9]    Disposition: Home or Self Care    Follow Up/Patient Instructions:     Medications:  Reconciled Home Medications:   Current Discharge Medication List      START taking these medications    Details   oxyCODONE-acetaminophen (PERCOCET) 5-325 mg per tablet Take 1 tablet by mouth every 4 (four) hours as needed for Pain.  Qty: 30 tablet, Refills: 0         CONTINUE these medications which have NOT CHANGED    Details   cetirizine (ZYRTEC) 10 MG tablet Take 10 mg by mouth once daily.      lidocaine (LIDODERM) 5 % Place 1 patch onto the skin once daily. Apply to right buttock (where pain begins).  Remove & Discard  patch within 12 hours or as directed by MD.  Qty: 5 patch, Refills: 0      lisinopril (PRINIVIL,ZESTRIL) 2.5 MG tablet Take 2.5 mg by mouth once daily.      meloxicam (MOBIC) 15 MG tablet Take 15 mg by mouth.             Discharge Procedure Orders  Diet general     Diet general     Activity as tolerated     Shower on day dressing removed (No bath)     Call MD for:  temperature >100.4     Call MD for:  persistent nausea and vomiting     Call MD for:  severe uncontrolled pain     Call MD for:  difficulty breathing, headache or visual disturbances     Call MD for:  redness, tenderness, or signs of infection (pain, swelling, redness, odor or green/yellow discharge around incision site)     Call MD for:  hives     Remove dressing in 24 hours     Activity as tolerated     Shower on day dressing removed (No bath)     Call MD for:  temperature >100.4     Call MD for:  persistent nausea and vomiting     Call MD for:  severe uncontrolled pain     Call MD for:  difficulty breathing, headache or visual disturbances     Call MD for:  redness, tenderness, or signs of infection (pain, swelling, redness, odor or green/yellow discharge around incision site)     Call MD for:  hives     Remove dressing in 24 hours       Follow-up Information     Lenin Shrestha MD In 1 week.    Specialty:  General Surgery  Contact information:  [address] [place] [address]  [id number]  [place] [address] [phone]

## 2017-12-19 NOTE — TRANSFER OF CARE
"Anesthesia Transfer of Care Note    Patient: Theresa Benson    Procedure(s) Performed: Procedure(s) (LRB):  REPAIR-HERNIA-UMBILICAL (5 YRS +)-OPEN (N/A)    Patient location: PACU    Anesthesia Type: general    Transport from OR: Transported from OR intubated on 100% O2 by AMBU with adequate controlled ventilation. Upon arrival to PACU/ICU, patient attached to ventilator and auscultated to confirm bilateral breath sounds and adequate TV    Post pain: adequate analgesia    Post assessment: no apparent anesthetic complications    Post vital signs: stable    Level of consciousness: responds to stimulation    Nausea/Vomiting: no nausea/vomiting    Complications: none    Transfer of care protocol was followedComments: On vent in PACU after transport with ambu. Vent- 600 Vt, 60%FiO2, RR 8, PEEP 5. Patient following commands and given 2mg/kg of suggamadex in PACU. VSS.      Last vitals:   Visit Vitals  BP (!) 140/85 (BP Location: Left arm, Patient Position: Lying)   Pulse 88   Temp 37 °C (98.6 °F) (Oral)   Resp 10   Ht 5' 11" (1.803 m)   Wt (!) 183.7 kg (405 lb)   SpO2 97%   Breastfeeding? No   BMI 56.49 kg/m²     "

## 2017-12-19 NOTE — ANESTHESIA PREPROCEDURE EVALUATION
12/19/2017  Theresa Benson is a 57 y.o., female.    Anesthesia Evaluation     I have reviewed the Nursing Notes.      Review of Systems  Anesthesia Hx:  No problems with previous Anesthesia   Social:  Non-Smoker    Cardiovascular:   Exercise tolerance: poor Denies Pacemaker. Hypertension  Denies Valvular problems/Murmurs.  Denies MI.  Denies CAD.    Denies CABG/stent.  Denies Dysrhythmias.   Denies Angina.             denies PVD no hyperlipidemia BILLS    Pulmonary:   Denies Pneumonia Denies COPD.  Denies Asthma.  Denies Shortness of breath.  Denies Recent URI. Likely some component of Pickwickian syndrome   Renal/:  Renal/ Normal     Hepatic/GI:   No Bowel Prep. Denies PUD. GERD Denies Liver Disease. Denies Hepatitis.    Musculoskeletal:   Arthritis     Neurological:  Neurology Normal    Endocrine:  Endocrine Normal        Physical Exam  General:  Morbid Obesity    Airway/Jaw/Neck:   M2  Good mouth opening  Denies loose dentition  Short thick neck somewhat reduces ROM          Mental Status:  Mental Status Findings: Normal        Anesthesia Plan  Type of Anesthesia, risks & benefits discussed:  Anesthesia Type:  general  Patient's Preference:   Intra-op Monitoring Plan: standard ASA monitors  Intra-op Monitoring Plan Comments:   Post Op Pain Control Plan: multimodal analgesia  Post Op Pain Control Plan Comments:   Induction:   IV  Beta Blocker:  Patient is not currently on a Beta-Blocker (No further documentation required).       Informed Consent: Patient understands risks and agrees with Anesthesia plan.  Questions answered. Anesthesia consent signed with patient.  ASA Score: 3     Day of Surgery Review of History & Physical:    H&P update referred to the surgeon.     Anesthesia Plan Notes: 400 pound pt presents for open umbilical hernia repair. Will have glidescope immediately available (pt notes she  had a very sore throat  For several days after a previous anesthetic; denies anyone told her whether she is a difficult intubation). Will preoxygenate and position well prior to induction.  Will give intra-op acetaminophen and will consider bipap in PACU        Ready For Surgery From Anesthesia Perspective.

## 2017-12-19 NOTE — DISCHARGE INSTRUCTIONS
[facility]  Office # [phone]     Discharge Instructions for Same Day Surgery     Call the office for and appointment if one has not already been made.     Diet: Drink plenty of fluids the first 48 hours and you may resume your   usual diet.     Activity: No heavy lifting (over 10 pounds), pushing or pulling until your   post op visit. Your doctor's office may have told you to limit your lifting to less weight, or even no weight.  Be sure to follow those instructions.    Note: You may ride in a car and you may drive when comfortable.     Do not drive, drink alcohol, or sign legal documents for 24 hours, or if taking narcotic pain medication.    Dressings:You may shower 24 hours after surgery and you may wash your hair.       Medical: Call the doctor for any of the following problems: fever above 101,   severe pain, bleeding, or abdominal distention (swelling).   If constipated you may take any stool softener you choose.     Occasionally small areas of skin numbness or an unpleasant skin sensation can result. Also, you may find that your incision is swollen and tender for a few days.  Some redness around sutures and staples is a normal reaction, but if the discomfort persists or worsens, call you doctor.       Discharge Instructions for Open Hernia Repair  You had a procedure called open hernia repair. Also called a rupture, a hernia is a tear or weakness in the wall of the belly. This weakness may be present at birth. Or it can be caused by the wear and tear of daily living. Hernias may get worse with time or with physical stress. But surgery can help repair the weakness and eliminate symptoms.  Activity after surgery  Recommendations include the following:  · After surgery, take it easy for the rest of the day. If you had general anesthesia, dont use machinery or power tools, drink alcohol, or make any major decisions for at least the first 24 hours.  · Dont drive while you are still  taking opioid pain medicine, and dont drive until you are able to step firmly on the brake pedal without hesitation.  · Ask others to help with chores and errands while you recover.  · Dont lift anything heavier than 10 pounds until your healthcare provider says its OK.  · Dont mow the lawn, use a vacuum , or do other strenuous activities until your healthcare provider says its OK.  · Walk as often as you feel able.  · Continue the coughing and deep breathing exercises that you learned in the hospital.  · Ask your healthcare provider when you can expect to return to work.  · Avoid constipation:  ¨ Eat fruits, vegetables, and whole grains.  ¨ Drink 6 to 8 glasses of water a day, unless otherwise instructed.  ¨ Use a laxative or a mild stool softener as instructed by your healthcare provider.  Bandage and incision care.      DRESSING:  You have Dermaflex covering your incision.  It is a clear liquid that dries like super glue.  Do not try to remove it.  It will flake off over time.  You may wash it with soap and water in the shower.  Do not apply any creams or lotion on it.      Medications: Pain medication should be taken only if needed and as directed. If antibiotics are prescribed, the medication should be taken until completed. You will be given an updated list of you medications.    CALL THE DOCTOR:    For any obvious bleeding (some dried blood over the incision is normal).                          Redness, swelling, foul smell around incision or fever over            101.                    Shortness of breath.   Persistent pain or nausea not relieved by medication.    Follow-up care  Keep follow-up appointments during your recovery. These allow your healthcare provider to check your progress and make sure youre healing well. You may also need to have your stitches, staples, or bandage removed. During office visits, tell your healthcare provider if you have any new symptoms. And be sure to ask  any questions you have.     When to call your healthcare provider  Call your healthcare provider immediately if you have any of the following:  · A large amount of swelling or bruising (some testicular swelling and bruising is common)  · Bleeding  · Increasing pain  · Increased redness or drainage of the incision  · Fever of 100.4°F (38.0°C) or higher, or as directed by your healthcare provider  · Trouble urinating  · Nausea or vomiting   .                                           Exparel information      To help control your pain after surgery, your surgeon injected Exparel (bupicacaine liposome injectable suspension) into your surgical incision just before the end of the procedure.      Exparel is a local analgesic that contains the local anesthetic bupivacaine..  Local anesthetics provide pain relief by numbing the tissue around the surgical site.    Exparel is specifically designed to release pain medication over time an can control wilfrido for up to 72 hours.    In addition to Exparel, your surgeon may provide other pain medications to control your pain.    Each patient is different and responds differently to pain medication.  Depending on how you respond to Exparel, you may require less additional pain medication during your recovery.    Side effects can occur with any medication and it is important not to ignore anything you may be experiencing.  Some patients who received Exparel experienced nausea, vomiting, or constipation.  Rarely, patients who receive bupivacaine (the active ingredient in Exparel) have experienced numbness and tingling in their mouth or lips, lightheadedness, or anxiety.  Speak with your doctor right away if you think you may be experiencing any of these sensations, or if you have other questions regarding possible side effects.    Products that contain bupivacaine, like Exparel, may cause a temporary loss of sensation or the ability to move in the area where bupivacaine was  injected.    Other formulations of bupivacaine should not be administered within 96 hours following administrations of Exparel.  Do not remove the teal colored bracelet that you have on for 96 hours.  This bracelet will let other health care workers know that you have received Exparel, and not to give you bupivacaine during this 96 hours.    Do not drive, drink alcohol, or sign legal documents for 24 hours, or if taking narcotic pain medication.      Preventing Deep Vein Thrombosis  Healthcare providers use the term venous thromboembolism (VTE) to describe two conditions, deep vein thrombosis (DVT) and pulmonary embolism (PE). They use the term VTE because the two conditions are very closely related. And, because their prevention and treatment are closely related.   DVT is a blood clot or thrombus in a deep vein. Most of these clots develop in the leg or thigh. But, they may form in a vein in the arm, or other part of the body.   Part of the blood clot may separate from the vein. This is called an embolus. It may travel to the lungs and form a pulmonary embolus. This can cut off the flow of blood to a portion of or to the entire lung. A blood clot in the lungs is a medical emergency and may cause death.  Over time, blood clots can also permanently damage veins. They must be treated right away to prevent problems.   Risk factors  Anyone can develop a blood clot. But the following risk factors make a blood clot more likely to happen:  · Being inactive for a long period, such as when youre in the hospital, or traveling by plane or car  · Injury to a vein from an accident, a broken bone, or surgery  · Having blood clots in the past or a family history of blood clots  · Blood clotting disorder  · Recent surgery  · Cancer and certain cancer treatments  · Smoking  Other factors can also put you at higher risk for a blood clot. They include:  · Age over 60 years  · Pregnancy  · Taking birth control pills or hormone  replacement  · Having other vein problems, such as varicose veins   · Being overweight  · Having a pacemaker or a central venous catheter. They increase the chance of a blood clot forming in an arm.  · Injection drug use. This also increases the chance of a blood clot forming in an arm.        How to prevent DVT  Preventing a blood clot means improving blood flow back to your heart. To help prevent a blood clot:  · Talk with your healthcare provider about a program of regular exercise.  · If your legs feel swollen or heavy, take a break and sit comfortably or lie down with your feet up.  · Keep a healthy weight.  · Quit smoking, if you smoke.  · Avoid sitting, standing, or lying down for long periods without moving your legs and feet:  ¨ When traveling by car, make frequent stops to get out and move around.  ¨ On long airplane, train, or bus rides, get up and move around when possible.  ¨ If you cant get up, wiggle your toes and tighten your calves to keep your blood moving, as pictured below.  If you need to have surgery, talk with your healthcare provider about a plan to prevent blood clots.   If you are in the hospital, your risk for blood clots increases. Your healthcare provider may prescribe an anticoagulant medicine or a blood thinner to help prevent blood clots. Or your healthcare provider may prescribe a sequential compression device (SCD) or intermittent pneumatic compression (IPC). The device has sleeves that fit around your legs. It applies gentle pressure to help with blood flow and prevent blood clots. Remove the sleeves so that you do not trip or fall when you are walking, like when you use the bathroom or shower. If you need help removing the sleeves, ask the nurse or aid. You may also want to try the following:    When to seek immediate medical attention  If you have symptoms of a blood clot in your lungs, call 911 or get emergency help. The symptoms are:  · Chest pain  · Trouble breathing  · Fast  heartbeat  · Coughing (may cough up blood)  · Sweating  · Fainting  When to call your healthcare provider  If you have symptoms of a blood clot, call your healthcare provider. The symptoms are:  · Pain  · Swelling  · Redness or discoloration in a leg, arm, or other area.        Deep Breathing    Deep breathing helps keep your lungs clear. If youve had surgery, this will help you get better faster. Deep breathing also helps you breathe easier and may prevent a lung infection.  Home care  Follow these steps to do deep breathing:  · Sit on the edge of a bed or a chair. You can also lie on your back with your knees slightly bent.  · If you've had surgery, hold a pillow or rolled-up towel firmly against your incision with both hands. Hug the pillow.  · Breathe out normally.  · Breathe in deeply through your nose. Feel your stomach push out as you breathe in.  · Pucker your lips as you would to blow out a candle.  · With your lips puckered, breathe out slowly through your mouth. You should feel your chest go down as you breathe out.  · Rest for a few seconds.  Repeat the above steps as many times as direct  When to seek medical care  Call your healthcare provider right away if you have any of the following:  · Fever of 100.4°F (38°C) or higher, or as directed by your healthcare provider  · Signs of infection, if you've had surgery. These include redness, swelling, or warmth at your incision site, or pus or fluid draining from the site.  · Brownish or bloody sputum  · Nausea or vomiting  · Increasing pain  · Dizziness or weakness  · Fast or irregular heartbeat  Call 911  Shortness of breath may be a sign of a serious health problem. Call 911 if you have shortness of breath that gets worse or have trouble breathing, especially with any of the symptoms below:  · Confusion or difficulty staying awake  · Loss of consciousness or fainting  · Chest pain or tightness  · Difficulty breathing or wheezing  · Bluish skin  · Coughing  up blood  · Severe pain         Fall Prevention  Millions of people fall every year and injure themselves. You may have had anesthesia or sedation which may increase your risk of falling. You may have health issues that put you at an increased risk of falling.     Here are ways to reduce your risk of falling.  ·   · Make your home safe by keeping walkways clear of objects you may trip over.  · Use non-slip pads under rugs. Do not use area rugs or small throw rugs.  · Use non-slip mats in bathtubs and showers.  · Install handrails and lights on staircases.  · Do not walk in poorly lit areas.  · Do not stand on chairs or wobbly ladders.  · Use caution when reaching overhead or looking upward. This position can cause a loss of balance.  · Be sure your shoes fit properly, have non-slip bottoms and are in good condition.   · Wear shoes both inside and out. Avoid going barefoot or wearing slippers.  · Be cautious when going up and down stairs, curbs, and when walking on uneven sidewalks.  · If your balance is poor, consider using a cane or walker.  · If your fall was related to alcohol use, stop or limit alcohol intake.   · If your fall was related to use of sleeping medicines, talk to your doctor about this. You may need to reduce your dosage at bedtime if you awaken during the night to go to the bathroom.    · To reduce the need for nighttime bathroom trips:  ¨ Avoid drinking fluids for several hours before going to bed  ¨ Empty your bladder before going to bed  ¨ Men can keep a urinal at the bedside  · Stay as active as you can. Balance, flexibility, strength, and endurance all come from exercise. They all play a role in preventing falls. Ask your healthcare provider which types of activity are right for you.  · Get your vision checked on a regular basis.  · If you have pets, know where they are before you stand up or walk so you don't trip over them.  · Use night lights.

## 2017-12-19 NOTE — OR NURSING
Pt arrived in PACU at 1002 intubated and  ambued by anesthesia, placed on ventilator by respiratory therapy. Pt slowly waking up. Dr Emmanuel and resp remain at bedside

## 2017-12-19 NOTE — OR NURSING
Pt fully awake, mouthing words, strong , sits up high lifts head off bed, extubated to 40% face tent by anesthesia, SUYAPA Brito crna. Resp irations full nonlabored, sats optimal, Dr fernandes advised of status

## 2017-12-20 NOTE — ANESTHESIA POSTPROCEDURE EVALUATION
"Anesthesia Post Evaluation    Patient: Theresa Benson    Procedure(s) Performed: Procedure(s) (LRB):  REPAIR-HERNIA-UMBILICAL (5 YRS +)-OPEN (N/A)    Final Anesthesia Type: general  Patient location during evaluation: PACU  Patient participation: Yes- Able to Participate  Level of consciousness: awake and alert and oriented  Post-procedure vital signs: reviewed and stable  Pain management: adequate  Airway patency: patent  PONV status at discharge: No PONV  Anesthetic complications: no      Cardiovascular status: blood pressure returned to baseline and hemodynamically stable  Respiratory status: unassisted and spontaneous ventilation  Hydration status: euvolemic  Follow-up not needed.  Comments: Pt looks great now. She did require a little extra time after the surgery to completely regain her strength. This was likely a combination of muscle paralytic, gas, and narcotic, and might not have required treatment in a patient with a slimmer body habitus. However, we felt that given her habitus we wanted to ensure she was completely able to maintain and protect her airway prior to extubation. So we gave sugamadex and we waited a little while in PACU prior to extubation. She did very well after extubation. We did give a little versed prior to bringing to PACU in order to prevent her having complete recall of being in the pacu, weak, with a breathing tube in her mouth.        Visit Vitals  BP (!) 162/70   Pulse 85   Temp 36.1 °C (97 °F) (Oral)   Resp 14   Ht 5' 11" (1.803 m)   Wt (!) 183.7 kg (405 lb)   SpO2 98%   Breastfeeding? No   BMI 56.49 kg/m²       Pain/Theo Score: Pain Assessment Performed: Yes (12/19/2017  1:07 PM)  Presence of Pain: complains of pain/discomfort (12/19/2017  1:07 PM)  Pain Rating Prior to Med Admin: 6 (12/19/2017 11:24 AM)  Pain Rating Post Med Admin: 4 (12/19/2017 11:31 AM)  Theo Score: 10 (12/19/2017 11:43 AM)  Modified Theo Score: 20 (12/19/2017  1:07 PM)      "

## 2018-10-27 ENCOUNTER — HOSPITAL ENCOUNTER (EMERGENCY)
Facility: HOSPITAL | Age: 58
Discharge: HOME | End: 2018-10-27
Attending: EMERGENCY MEDICINE
Payer: MEDICAID

## 2018-10-27 VITALS
RESPIRATION RATE: 18 BRPM | BODY MASS INDEX: 43.4 KG/M2 | HEIGHT: 69 IN | OXYGEN SATURATION: 93 % | WEIGHT: 293 LBS | SYSTOLIC BLOOD PRESSURE: 160 MMHG | HEART RATE: 95 BPM | TEMPERATURE: 99 F | DIASTOLIC BLOOD PRESSURE: 96 MMHG

## 2018-10-27 DIAGNOSIS — M25.562 BILATERAL KNEE PAIN: ICD-10-CM

## 2018-10-27 DIAGNOSIS — M25.561 BILATERAL KNEE PAIN: ICD-10-CM

## 2018-10-27 DIAGNOSIS — M17.0 PRIMARY OSTEOARTHRITIS OF BOTH KNEES: Primary | ICD-10-CM

## 2018-10-27 PROCEDURE — 99283 EMERGENCY DEPT VISIT LOW MDM: CPT | Mod: 25

## 2018-10-27 PROCEDURE — 25000003 PHARM REV CODE 250: Performed by: PHYSICIAN ASSISTANT

## 2018-10-27 RX ORDER — AMITRIPTYLINE HYDROCHLORIDE 25 MG/1
25 TABLET, FILM COATED ORAL NIGHTLY PRN
COMMUNITY

## 2018-10-27 RX ORDER — KETOROLAC TROMETHAMINE 10 MG/1
10 TABLET, FILM COATED ORAL
Status: COMPLETED | OUTPATIENT
Start: 2018-10-27 | End: 2018-10-27

## 2018-10-27 RX ORDER — CHLORTHALIDONE 25 MG/1
12.5 TABLET ORAL DAILY
COMMUNITY

## 2018-10-27 RX ORDER — LISINOPRIL 40 MG/1
40 TABLET ORAL DAILY
COMMUNITY

## 2018-10-27 RX ORDER — TIZANIDINE 4 MG/1
4 TABLET ORAL EVERY 6 HOURS PRN
COMMUNITY

## 2018-10-27 RX ORDER — KETOROLAC TROMETHAMINE 10 MG/1
10 TABLET, FILM COATED ORAL EVERY 6 HOURS PRN
Qty: 15 TABLET | Refills: 0 | OUTPATIENT
Start: 2018-10-27 | End: 2020-01-16

## 2018-10-27 RX ADMIN — KETOROLAC TROMETHAMINE 10 MG: 10 TABLET, FILM COATED ORAL at 05:10

## 2018-10-27 NOTE — DISCHARGE INSTRUCTIONS
Yet been prescribed Toradol.  You can take this and replacement of meloxicam.  Do not take multiple NSAIDs together at the same time.    You can continue taking her muscle relaxer as directed.    Follow-up with primary care and orthopedics for further management.    Return to the emergency department for any concerns.

## 2018-10-27 NOTE — ED PROVIDER NOTES
Encounter Date: 10/27/2018    SCRIBE #1 NOTE: IAry am scribing for, and in the presence of,  Malina York PA-C. I have scribed the following portions of the note - Other sections scribed: HPI and ROS.       History     Chief Complaint   Patient presents with    Knee Pain     bilat knee pain.  dx w/ cysts in bilat knees by [facility] x 3 months ago.  states knees are inflamed, painful, and swollen.     CC: Knee Pain    HPI: This 58 y.o female who has Arthritis, Hypertension, and Morbid obesity presents to the ED for an evaluation of acute, intermittent, 9/10 bilateral knee pain for the past 3 months.  Patient reports of increased pain that began yesterday.  Patient denies any recent falls, trauma, or injuries.  Patient reports being diagnosed with bilateral Baker's cyst in the past and reports last attempting to have fluid drained from the cysts on August 8th.  Patient reports she has been attempting treatment with muscle relaxants and Mobic.  Patient denies fever, chills, extremity numbness, extremity weakness, or any other associated symptoms.  No alleviating factors.      The history is provided by the patient. No  was used.     Review of patient's allergies indicates:  No Known Allergies  Past Medical History:   Diagnosis Date    Acid reflux     Arthritis     Hypertension     Morbid obesity      Past Surgical History:   Procedure Laterality Date    FRACTURE SURGERY      left wrist    HERNIA REPAIR      umbilical    HYSTERECTOMY      REPAIR-HERNIA-UMBILICAL (5 YRS +)-OPEN N/A 12/19/2017    Performed by Lenin Shrestha [facility] at [facility]    TUBAL LIGATION       Family History   Problem Relation Age of Onset    Hypertension Brother      Social History     Tobacco Use    Smoking status: Never Smoker    Smokeless tobacco: Never Used   Substance Use Topics    Alcohol use: No    Drug use: No     Review of Systems   Constitutional: Negative for chills and fever.   HENT: Negative  for congestion, ear pain and sore throat.    Eyes: Negative for pain.   Respiratory: Negative for cough and shortness of breath.    Cardiovascular: Negative for chest pain.   Gastrointestinal: Negative for abdominal pain, diarrhea, nausea and vomiting.   Genitourinary: Negative for decreased urine volume, dysuria, vaginal discharge and vaginal pain.   Musculoskeletal: Positive for arthralgias. Negative for back pain and neck pain.   Skin: Negative for rash.   Neurological: Negative for weakness, numbness and headaches.   Psychiatric/Behavioral: Negative for confusion. The patient is not nervous/anxious.        Physical Exam     Initial Vitals [10/27/18 1615]   BP Pulse Resp Temp SpO2   (!) 143/98 99 18 97.5 °F (36.4 °C) 98 %      MAP       --         Physical Exam    Nursing note and vitals reviewed.  Constitutional: Vital signs are normal. She appears well-developed and well-nourished. She is not diaphoretic. She is cooperative.  Non-toxic appearance. She does not have a sickly appearance. She does not appear ill. No distress.   HENT:   Head: Normocephalic and atraumatic.   Right Ear: Tympanic membrane, external ear and ear canal normal.   Left Ear: Tympanic membrane, external ear and ear canal normal.   Nose: Nose normal.   Mouth/Throat: Uvula is midline, oropharynx is clear and moist and mucous membranes are normal. No oropharyngeal exudate.   Eyes: Conjunctivae, EOM and lids are normal. Pupils are equal, round, and reactive to light.   Neck: Trachea normal, normal range of motion, full passive range of motion without pain and phonation normal. Neck supple.   Cardiovascular: Normal rate, regular rhythm, normal heart sounds and intact distal pulses. Exam reveals no gallop and no friction rub.    No murmur heard.  Pulses:       Popliteal pulses are 2+ on the right side, and 2+ on the left side.        Dorsalis pedis pulses are 2+ on the right side, and 2+ on the left side.   Capillary refill < 2 seconds in  bilateral lower extremities.    Pulmonary/Chest: Effort normal and breath sounds normal. No respiratory distress. She has no decreased breath sounds. She has no wheezes. She has no rhonchi. She has no rales.   Abdominal: Soft. Normal appearance and bowel sounds are normal. She exhibits no distension and no mass. There is no tenderness. There is no rigidity, no rebound and no guarding.   Musculoskeletal: Normal range of motion.        Right knee: She exhibits normal range of motion, no swelling, no effusion, no ecchymosis, no deformity, no laceration, no erythema and normal alignment.        Left knee: She exhibits normal range of motion, no swelling, no effusion, no ecchymosis, no deformity, no laceration, no erythema and normal alignment.        Legs:  Pain elicited with ambulation. Full ROM of the knees bilaterally. No evidence of infection or injury. No effusion or crepitus. No ligament laxity. Sensation and strength intact. Patient ambulatory. Peripheral pulses intact.    Neurological: She is alert and oriented to person, place, and time. She has normal strength. She exhibits normal muscle tone. Coordination and gait normal.   Skin: Skin is warm and dry. Capillary refill takes less than 2 seconds. No rash noted.   Psychiatric: She has a normal mood and affect. Her speech is normal and behavior is normal. Judgment and thought content normal. Cognition and memory are normal.         ED Course   Procedures  Labs Reviewed - No data to display       Imaging Results          X-Ray Knee 3 View Right (Final result)  Result time 10/27/18 17:06:33   Procedure changed from X-Ray Knee 1 or 2 View Bilateral     Final result by Gisele Ray MD (10/27/18 17:06:33)                 Impression:      Degenerative change      Electronically signed by: Gisele Ray MD  Date:    10/27/2018  Time:    17:06             Narrative:    EXAMINATION:  XR KNEE 3 VIEW RIGHT    CLINICAL HISTORY:  knee pain; Pain in right  knee    TECHNIQUE:  AP, lateral, and Merchant views of the right knee were performed.    COMPARISON:  None    FINDINGS:  There is moderate osteophytic spurring about the knee.  Moderate to advanced medial and lateral compartment narrowing is noted.  No evidence of fracture.  No joint effusion.                               X-Ray Knee 3 View Left (Final result)  Result time 10/27/18 17:05:44    Final result by Gisele Ray MD (10/27/18 17:05:44)                 Impression:      Degenerative change      Electronically signed by: Gisele Ray MD  Date:    10/27/2018  Time:    17:05             Narrative:    EXAMINATION:  XR KNEE 3 VIEW LEFT    CLINICAL HISTORY:  knee pain; Pain in right knee    TECHNIQUE:  AP, lateral, and Merchant views of the left knee were performed.    COMPARISON:  None    FINDINGS:  There is moderate osteophytic spurring about the knee.  Moderate to advanced medial and lateral compartment narrowing are noted.  No joint effusion.                                       APC / Resident Notes:   This is an evaluation of a 58 y.o. Female with morbid obesity, that presents to the Emergency Department for knee pain. Patient reports chronic bilateral knee pain. She was diagnosed with bilateral Baker's cysts 3 months ago by [facility] orthopedics team. She reports having cysts drained which provided relief. She also reports taking Meloxicam and Tizanidine, but denies current relief with these medications. She denies any new trauma or injury.     Exam findings: Patient is non-toxic, afebrile and well appearing. Patient is obese. Pain elicited with ambulation. Full ROM of the knees bilaterally. No evidence of infection or injury. No effusion or crepitus. No ligament laxity. Sensation and strength intact. Patient ambulatory. Peripheral pulses intact.     If available, past records have been reviewed.  Vitals are reassuring.  Results: Xray right and left knees shows degenerative changes. No evidence of  effusion, fracture, dislocation.    My overall impression: osteoarthritis and knee pain  DDx: knee pain, osteoarthritis, knee effusion, fracture, dislocaiton  No evidence of septic joint.    ED course: Toradol po given. I will prescribe Toradol in place of Meloxicam. I will recommend close follow-up with PCP and orthopedics. Patient is stable for discharge. ED return precautions given.    The diagnosis and treatment plan have been discussed with the patient. All questions and concerns have been addressed. Patient expressed understanding. An educational information sheet was given to the patient prior to discharge.         Malina Robison PA-C         Scribe Attestation:   Scribe #1: I performed the above scribed service and the documentation accurately describes the services I performed. I attest to the accuracy of the note.    Attending Attestation:           Physician Attestation for Scribe:  Physician Attestation Statement for Scribe #1: I, Malina Robison PA-C, reviewed documentation, as scribed by Ary Osman in my presence, and it is both accurate and complete.                    Clinical Impression:   The primary encounter diagnosis was Primary osteoarthritis of both knees. A diagnosis of Bilateral knee pain was also pertinent to this visit.      Disposition:   Disposition: Discharged  Condition: Stable                        Malina Robison PA-C  10/27/18 [id number]

## 2020-01-16 ENCOUNTER — HOSPITAL ENCOUNTER (EMERGENCY)
Facility: HOSPITAL | Age: 60
Discharge: HOME | End: 2020-01-16
Attending: EMERGENCY MEDICINE
Payer: MEDICAID

## 2020-01-16 VITALS
BODY MASS INDEX: 41.02 KG/M2 | OXYGEN SATURATION: 100 % | RESPIRATION RATE: 20 BRPM | WEIGHT: 293 LBS | TEMPERATURE: 98 F | HEART RATE: 79 BPM | HEIGHT: 71 IN | DIASTOLIC BLOOD PRESSURE: 72 MMHG | SYSTOLIC BLOOD PRESSURE: 164 MMHG

## 2020-01-16 DIAGNOSIS — R06.02 SOB (SHORTNESS OF BREATH): ICD-10-CM

## 2020-01-16 DIAGNOSIS — R06.02 SHORTNESS OF BREATH: ICD-10-CM

## 2020-01-16 DIAGNOSIS — M19.90 ARTHRITIS: Primary | ICD-10-CM

## 2020-01-16 DIAGNOSIS — N39.0 URINARY TRACT INFECTION WITHOUT HEMATURIA, SITE UNSPECIFIED: ICD-10-CM

## 2020-01-16 DIAGNOSIS — M79.89 LEG SWELLING: ICD-10-CM

## 2020-01-16 DIAGNOSIS — M25.569 KNEE PAIN: ICD-10-CM

## 2020-01-16 LAB
ALBUMIN SERPL BCP-MCNC: 3 G/DL (ref 3.5–5.2)
ALP SERPL-CCNC: 113 U/L (ref 55–135)
ALT SERPL W/O P-5'-P-CCNC: 14 U/L (ref 10–44)
ANION GAP SERPL CALC-SCNC: 8 MMOL/L (ref 8–16)
AST SERPL-CCNC: 13 U/L (ref 10–40)
BACTERIA #/AREA URNS HPF: ABNORMAL /HPF
BASOPHILS # BLD AUTO: 0.03 K/UL (ref 0–0.2)
BASOPHILS NFR BLD: 0.3 % (ref 0–1.9)
BILIRUB SERPL-MCNC: 0.4 MG/DL (ref 0.1–1)
BILIRUB UR QL STRIP: NEGATIVE
BNP SERPL-MCNC: 23 PG/ML (ref 0–99)
BUN SERPL-MCNC: 18 MG/DL (ref 6–20)
CALCIUM SERPL-MCNC: 9.5 MG/DL (ref 8.7–10.5)
CHLORIDE SERPL-SCNC: 106 MMOL/L (ref 95–110)
CLARITY UR: ABNORMAL
CO2 SERPL-SCNC: 25 MMOL/L (ref 23–29)
COLOR UR: YELLOW
CREAT SERPL-MCNC: 1.2 MG/DL (ref 0.5–1.4)
DIFFERENTIAL METHOD: ABNORMAL
EOSINOPHIL # BLD AUTO: 0.3 K/UL (ref 0–0.5)
EOSINOPHIL NFR BLD: 2.7 % (ref 0–8)
ERYTHROCYTE [DISTWIDTH] IN BLOOD BY AUTOMATED COUNT: 15.2 % (ref 11.5–14.5)
EST. GFR  (AFRICAN AMERICAN): 58 ML/MIN/1.73 M^2
EST. GFR  (NON AFRICAN AMERICAN): 50 ML/MIN/1.73 M^2
GLUCOSE SERPL-MCNC: 91 MG/DL (ref 70–110)
GLUCOSE UR QL STRIP: NEGATIVE
HCT VFR BLD AUTO: 35.3 % (ref 37–48.5)
HGB BLD-MCNC: 11 G/DL (ref 12–16)
HGB UR QL STRIP: ABNORMAL
IMM GRANULOCYTES # BLD AUTO: 0.04 K/UL (ref 0–0.04)
IMM GRANULOCYTES NFR BLD AUTO: 0.4 % (ref 0–0.5)
INR PPP: 1 (ref 0.8–1.2)
KETONES UR QL STRIP: NEGATIVE
LEUKOCYTE ESTERASE UR QL STRIP: ABNORMAL
LYMPHOCYTES # BLD AUTO: 2.9 K/UL (ref 1–4.8)
LYMPHOCYTES NFR BLD: 31.4 % (ref 18–48)
MCH RBC QN AUTO: 28.4 PG (ref 27–31)
MCHC RBC AUTO-ENTMCNC: 31.2 G/DL (ref 32–36)
MCV RBC AUTO: 91 FL (ref 82–98)
MICROSCOPIC COMMENT: ABNORMAL
MONOCYTES # BLD AUTO: 0.8 K/UL (ref 0.3–1)
MONOCYTES NFR BLD: 9 % (ref 4–15)
NEUTROPHILS # BLD AUTO: 5.2 K/UL (ref 1.8–7.7)
NEUTROPHILS NFR BLD: 56.2 % (ref 38–73)
NITRITE UR QL STRIP: POSITIVE
NRBC BLD-RTO: 0 /100 WBC
PH UR STRIP: 5 [PH] (ref 5–8)
PLATELET # BLD AUTO: 405 K/UL (ref 150–350)
PMV BLD AUTO: 9.8 FL (ref 9.2–12.9)
POTASSIUM SERPL-SCNC: 4.1 MMOL/L (ref 3.5–5.1)
PROT SERPL-MCNC: 8.2 G/DL (ref 6–8.4)
PROT UR QL STRIP: NEGATIVE
PROTHROMBIN TIME: 10.4 SEC (ref 9–12.5)
RBC # BLD AUTO: 3.88 M/UL (ref 4–5.4)
RBC #/AREA URNS HPF: 30 /HPF (ref 0–4)
SODIUM SERPL-SCNC: 139 MMOL/L (ref 136–145)
SP GR UR STRIP: 1.02 (ref 1–1.03)
TROPONIN I SERPL DL<=0.01 NG/ML-MCNC: <0.006 NG/ML (ref 0–0.03)
URN SPEC COLLECT METH UR: ABNORMAL
UROBILINOGEN UR STRIP-ACNC: NEGATIVE EU/DL
WBC # BLD AUTO: 9.27 K/UL (ref 3.9–12.7)
WBC #/AREA URNS HPF: >100 /HPF (ref 0–5)
WBC CLUMPS URNS QL MICRO: ABNORMAL

## 2020-01-16 PROCEDURE — 83880 ASSAY OF NATRIURETIC PEPTIDE: CPT

## 2020-01-16 PROCEDURE — 87088 URINE BACTERIA CULTURE: CPT

## 2020-01-16 PROCEDURE — 84484 ASSAY OF TROPONIN QUANT: CPT

## 2020-01-16 PROCEDURE — 93010 ELECTROCARDIOGRAM REPORT: CPT | Mod: ,,, | Performed by: INTERNAL MEDICINE

## 2020-01-16 PROCEDURE — 93010 EKG 12-LEAD: ICD-10-PCS | Mod: ,,, | Performed by: INTERNAL MEDICINE

## 2020-01-16 PROCEDURE — 87186 SC STD MICRODIL/AGAR DIL: CPT | Mod: 59

## 2020-01-16 PROCEDURE — 81000 URINALYSIS NONAUTO W/SCOPE: CPT

## 2020-01-16 PROCEDURE — 63600175 PHARM REV CODE 636 W HCPCS: Performed by: EMERGENCY MEDICINE

## 2020-01-16 PROCEDURE — 87077 CULTURE AEROBIC IDENTIFY: CPT

## 2020-01-16 PROCEDURE — 85610 PROTHROMBIN TIME: CPT

## 2020-01-16 PROCEDURE — 85025 COMPLETE CBC W/AUTO DIFF WBC: CPT

## 2020-01-16 PROCEDURE — 96374 THER/PROPH/DIAG INJ IV PUSH: CPT

## 2020-01-16 PROCEDURE — 99285 EMERGENCY DEPT VISIT HI MDM: CPT | Mod: 25

## 2020-01-16 PROCEDURE — 87086 URINE CULTURE/COLONY COUNT: CPT

## 2020-01-16 PROCEDURE — 93005 ELECTROCARDIOGRAM TRACING: CPT

## 2020-01-16 PROCEDURE — 80053 COMPREHEN METABOLIC PANEL: CPT

## 2020-01-16 RX ORDER — NAPROXEN 500 MG/1
500 TABLET ORAL 2 TIMES DAILY WITH MEALS
Qty: 60 TABLET | Refills: 0 | Status: SHIPPED | OUTPATIENT
Start: 2020-01-16

## 2020-01-16 RX ORDER — KETOROLAC TROMETHAMINE 30 MG/ML
10 INJECTION, SOLUTION INTRAMUSCULAR; INTRAVENOUS
Status: COMPLETED | OUTPATIENT
Start: 2020-01-16 | End: 2020-01-16

## 2020-01-16 RX ORDER — NITROFURANTOIN 25; 75 MG/1; MG/1
100 CAPSULE ORAL 2 TIMES DAILY
Qty: 10 CAPSULE | Refills: 0 | Status: SHIPPED | OUTPATIENT
Start: 2020-01-16 | End: 2020-01-21

## 2020-01-16 RX ADMIN — KETOROLAC TROMETHAMINE 10 MG: 30 INJECTION, SOLUTION INTRAMUSCULAR; INTRAVENOUS at 10:01

## 2020-01-16 NOTE — ED NOTES
Pt advised on the importance of collection of urine. Pt reports unable to make urine at this point. Will reassess.

## 2020-01-16 NOTE — DISCHARGE INSTRUCTIONS
Please return for worsening swelling, chest pain, shortness of breath, fever or any other concerns. Please call your rheumatologist to schedule an earlier appt. Keep your legs elevated and wear compression stockings to help with swelling.     Thank you for coming to our Emergency Department today. It is important to remember that some problems are difficult to diagnose and may not be found during your first visit. Be sure to follow up with your primary care doctor and review any labs/imaging that was performed with them. If you do not have a primary care doctor, you may contact the one listed on your discharge paperwork or you may also call the [facility] Appointment Desk at [phone] to schedule an appointment with one.     All medications may potentially have side effects and it is impossible to predict which medications may give you side effects. If you feel that you are having a negative effect of any medication you should immediately stop taking them and seek medical attention.    Return to the ER with any questions/concerns, new/concerning symptoms, worsening or failure to improve. Do not drive or make any important decisions for 24 hours if you have received any pain medications, sedatives or mood altering drugs during your ER visit.

## 2020-01-16 NOTE — ED TRIAGE NOTES
"59 y.o. Female presents to the ED with chief complaint of leg swelling. pt reports "I have cysts behind both my knees." Pt reports increased pain and swelling noted to bilateral knees. Pt denies CP or SOB.  Reports taking tylenol and muscle relaxer for hx of arthritis with no relief. Pt resting in bed in NAD. Side rails up x2. Denies hx of blood clots or long travels.   "

## 2020-01-16 NOTE — ED PROVIDER NOTES
Encounter Date: 1/16/2020    SCRIBE #1 NOTE: I, Mary Skinner, am scribing for, and in the presence of,  Leeanne Reddy MD. I have scribed the following portions of the note - Other sections scribed: HPI, ROS and PE.       History     Chief Complaint   Patient presents with    Leg Swelling     Pt c/o worsening BLE swelling since Sunday. Denies any SOB      CC: Leg Swelling    HPI: This 59 y.o female, with a medical history of acid reflux, arthritis, hypertension, and morbid obesity, presents to the ED c/o bilateral leg swelling with increased pain to the bilateral knees (posterior and medial regions) since Sunday. Pt reports that she was diagnosed with cyst to the bilateral posterior knees 1.5 years ago via ultrasound. She states that she also has a history of arthritis to the bilateral knees and has been taking tylenol as well as muscle relaxants for treatment. She has had severe OA since she was a child and is followed by rheumatology. Pt reports that she fell less than 1x week ago onto her left side but did not injure her knees, hips, hit head or have a LOC. She states that she sustained bruising to the left hip, however, she denies any trauma to the bilateral legs. Pt notes that she ambulates with a walker. Additionally, she c/o shortness of breath, which she notes she has been experiencing with exertion for the last 6 months. Pt denies chest pain, fever, hemoptysis and hip pain. No history of blood clots or cancer. No recent hospitalizations or extended travels. No other associated symptoms.     The history is provided by the patient.     Review of patient's allergies indicates:  No Known Allergies  Past Medical History:   Diagnosis Date    Acid reflux     Arthritis     Hypertension     Morbid obesity      Past Surgical History:   Procedure Laterality Date    FRACTURE SURGERY      left wrist    HERNIA REPAIR      umbilical    HYSTERECTOMY      TUBAL LIGATION       Family History   Problem Relation Age  of Onset    Hypertension Brother      Social History     Tobacco Use    Smoking status: Never Smoker    Smokeless tobacco: Never Used   Substance Use Topics    Alcohol use: No    Drug use: Yes     Types: Marijuana     Comment: occ     Review of Systems   Constitutional: Negative for chills, diaphoresis and fever.   HENT: Negative for congestion, rhinorrhea and sore throat.    Eyes: Negative for photophobia and visual disturbance.   Respiratory: Positive for shortness of breath. Negative for cough.         (-) hemoptysis   Cardiovascular: Positive for leg swelling (bilateral). Negative for chest pain.   Gastrointestinal: Negative for abdominal pain, blood in stool, constipation, diarrhea, nausea and vomiting.   Genitourinary: Negative for dysuria, flank pain, frequency, hematuria and urgency.   Musculoskeletal: Negative for back pain, neck pain and neck stiffness.        (+) increased pain to the posterior and medial regions of the bilateral knees; (-) hip pain   Skin: Negative for rash and wound.   Neurological: Negative for dizziness, weakness, light-headedness, numbness and headaches.   Psychiatric/Behavioral: Negative for confusion and suicidal ideas.   All other systems reviewed and are negative.      Physical Exam     Initial Vitals [01/16/20 0954]   BP Pulse Resp Temp SpO2   (!) 178/86 86 20 97.8 °F (36.6 °C) 98 %      MAP       --         Physical Exam    Nursing note and vitals reviewed.  Constitutional: She appears well-developed and well-nourished. She is not diaphoretic. No distress.   Patient is morbidly obese.   HENT:   Head: Normocephalic and atraumatic.   Mouth/Throat: Oropharynx is clear and moist. No oropharyngeal exudate.   Eyes: Conjunctivae and EOM are normal. Pupils are equal, round, and reactive to light. Right eye exhibits no discharge. Left eye exhibits no discharge. No scleral icterus.   Neck: Normal range of motion. Neck supple. No JVD present.   Unable to visualize JVD as it is  limited by patients habitus.   Cardiovascular: Normal rate, regular rhythm, normal heart sounds and intact distal pulses. Exam reveals no gallop and no friction rub.    No murmur heard.  Pulmonary/Chest: Breath sounds normal. No respiratory distress. She has no wheezes. She has no rhonchi. She has no rales.   Abdominal: Soft. Bowel sounds are normal. She exhibits no distension. There is no tenderness. There is no rebound and no guarding.   Musculoskeletal: Normal range of motion. She exhibits edema and tenderness.   There is 3+ swelling to the bilateral lower extremities. There is pain bilaterally with range of motion of the knees. Medial joint line tenderness present bilaterally.   Lymphadenopathy:     She has no cervical adenopathy.   Neurological: She is alert and oriented to person, place, and time. She has normal strength. No cranial nerve deficit or sensory deficit.   Skin: Skin is warm and dry. Capillary refill takes less than 2 seconds.   There is an old abrasion scab to the left hip. No pain with range of motion of hip.   Psychiatric: She has a normal mood and affect. Thought content normal.         ED Course   Procedures  Labs Reviewed   CBC W/ AUTO DIFFERENTIAL - Abnormal; Notable for the following components:       Result Value    RBC 3.88 (*)     Hemoglobin 11.0 (*)     Hematocrit 35.3 (*)     Mean Corpuscular Hemoglobin Conc 31.2 (*)     RDW 15.2 (*)     Platelets 405 (*)     All other components within normal limits   COMPREHENSIVE METABOLIC PANEL - Abnormal; Notable for the following components:    Albumin 3.0 (*)     eGFR if  58 (*)     eGFR if non  50 (*)     All other components within normal limits   URINALYSIS, REFLEX TO URINE CULTURE - Abnormal; Notable for the following components:    Appearance, UA Cloudy (*)     Occult Blood UA 2+ (*)     Nitrite, UA Positive (*)     Leukocytes, UA 2+ (*)     All other components within normal limits    Narrative:      Preferred Collection Type->Urine, Clean Catch   URINALYSIS MICROSCOPIC - Abnormal; Notable for the following components:    RBC, UA 30 (*)     WBC, UA >100 (*)     WBC Clumps, UA Moderate (*)     Bacteria Many (*)     All other components within normal limits    Narrative:     Preferred Collection Type->Urine, Clean Catch   CULTURE, URINE   TROPONIN I   B-TYPE NATRIURETIC PEPTIDE   PROTIME-INR     EKG Readings: (Independently Interpreted)   Normal sinus rhythm with sinus rhythm at 82.  No ST elevation or depression.  Left axis deviation.  Normal intervals.  No significant T-wave inversions.     ECG Results          EKG 12-lead (In process)  Result time 01/16/20 14:20:11    In process by Interface, Lab In [facility] (01/16/20 14:20:11)                 Narrative:    Test Reason : R06.02,    Vent. Rate : 082 BPM     Atrial Rate : 082 BPM     P-R Int : 186 ms          QRS Dur : 088 ms      QT Int : 396 ms       P-R-T Axes : 065 -33 034 degrees     QTc Int : 462 ms    Normal sinus rhythm with sinus arrhythmia  Left axis deviation  Abnormal ECG  When compared with ECG of 12-DEC-2017 12:15,  No significant change was found    Referred By: [email or web address]   SELF           Confirmed By:                   In process by Interface, Lab In [facility] (01/16/20 14:18:00)                 Narrative:    Test Reason : R06.02,    Vent. Rate : 082 BPM     Atrial Rate : 082 BPM     P-R Int : 186 ms          QRS Dur : 088 ms      QT Int : 396 ms       P-R-T Axes : 065 -33 034 degrees     QTc Int : 462 ms    Normal sinus rhythm with sinus arrhythmia  Left axis deviation  Abnormal ECG  When compared with ECG of 12-DEC-2017 12:15,  No significant change was found    Referred By: AAAREFERR   SELF           Confirmed By:                             Imaging Results          X-Ray Chest PA And Lateral (Final result)  Result time 01/16/20 12:28:22    Final result by Donato Stafford Jr., MD (01/16/20 12:28:22)                 Impression:      No convincing  cardiopulmonary abnormality.  Radiograph is under penetrated accentuating the pulmonary markings.      Electronically signed by: Donato Stafford MD  Date:    01/16/2020  Time:    12:28             Narrative:    EXAMINATION:  XR CHEST PA AND LATERAL    CLINICAL HISTORY:  Shortness of breath    TECHNIQUE:  PA and lateral views of the chest were performed.    COMPARISON:  February 2014.    FINDINGS:  Radiograph is under penetrated.  Monitoring leads are in place.  Heart size pulmonary vessels are normal.  No confluent consolidation.  Degenerative change in the spine.  No significant pleural fluid.                               X-Ray Knee 3 View Bilateral (Final result)  Result time 01/16/20 12:30:30   Procedure changed from X-Ray Knee 3 View Left     Final result by Donato Stafford Jr., MD (01/16/20 12:30:30)                 Impression:      Advanced degenerative changes right greater than left.  Probable right effusion.      Electronically signed by: Dontao Stafford MD  Date:    01/16/2020  Time:    12:30             Narrative:    EXAMINATION:  XR KNEE 3 VIEW BILATERAL    CLINICAL HISTORY:  knee pain; Pain in unspecified knee    TECHNIQUE:  AP, lateral, and Merchant views of both knees were performed.    COMPARISON:  Left knee October 2018.    FINDINGS:  There is severe narrowing of the medial femoral tibial compartments bilaterally right greater than left.  Hypertrophic new bone noted.  There may be some fluid on the right.  No convincing left effusion.  Hypertrophic changes noted.                               US Lower Extremity Veins Bilateral (Final result)  Result time 01/16/20 11:53:47    Final result by Miguel Angel Mcclellan MD (01/16/20 11:53:47)                 Impression:      No evidence of deep venous thrombosis in either lower extremity.    Incidental bilateral Baker's cyst and subcutaneous scalp edema.      Electronically signed by: Miguel Angel Mcclellan MD  Date:    01/16/2020  Time:    11:53              Narrative:    EXAMINATION:  US LOWER EXTREMITY VEINS BILATERAL    CLINICAL HISTORY:  Other specified soft tissue disorders    TECHNIQUE:  Duplex and color flow Doppler and dynamic compression was performed of the bilateral lower extremity veins was performed.    COMPARISON:  None    FINDINGS:  Right thigh veins: The common femoral, femoral, popliteal, upper greater saphenous, and deep femoral veins are patent and free of thrombus. The veins are normally compressible and have normal phasic flow and augmentation response.    Right calf veins: The visualized calf veins are patent.    Left thigh veins: The common femoral, femoral, popliteal, upper greater saphenous, and deep femoral veins are patent and free of thrombus. The veins are normally compressible and have normal phasic flow and augmentation response.    Left calf veins: The visualized calf veins are patent.    Miscellaneous: Bilateral Baker's cyst popliteal space, 3 x 1.4 x 2.4 cm on right and 4.6 x 1.5 x 3 cm on left.  Bilateral subcutaneous calf edema.                            MDM  This evaluation 59-year-old female with past medical history of obesity, osteoarthritis, Baker cyst who presents with bilateral knee pain for the last 6 months as well as swelling to her legs.  Reports swelling improves when keeping her legs elevated.  Differential diagnosis includes but is not limited to DVT, CHF exacerbation, low protein, kidney dysfunction, dependent edema, osteoarthritis, occult fracture, Baker cyst.  Labs with mild anemia patient's baseline.  CMP with mild low albumin at 3.0.  Normal troponin and normal BNP.  Chest x-ray without signs of fluid overload.  Knee radiographs with signs of degenerative joint disease similar to previous.  DVT study with Baker cyst without any signs of DVT.  Urinalysis which was obtained to evaluate for protein urea was nitrate positive and consistent with a urinary tract infection.  Patient denies symptoms but given the degree of  positive UA will treat with Macrobid.  Patient discharged home with symptomatic control and advised to keep feet well elevated.  Advised to wear her compression stockings which she has at home.  Discussed with her weight loss as well as following up with Rheumatology and PCP to discuss PMNR. patient agreed with plan and will discharge at this time.  She is given strict return precautions.                Scribe Attestation:   Scribe #1: I performed the above scribed service and the documentation accurately describes the services I performed. I attest to the accuracy of the note.                          Clinical Impression:       ICD-10-CM ICD-9-CM   1. Arthritis M19.90 716.90   2. Shortness of breath R06.02 786.05   3. SOB (shortness of breath) R06.02 786.05   4. Leg swelling M79.89 729.81   5. Knee pain M25.569 719.46   6. Urinary tract infection without hematuria, site unspecified N39.0 599.0            I,Leeanne Reddy, personally performed the services described in this documentation. All medical record entries made by the scribe were at my direction and in my presence. I have reviewed the chart and agree that the record reflects my personal performance and is accurate and complete.                 Leeanne Reddy MD  01/16/20 2003

## 2020-01-18 LAB — BACTERIA UR CULT: ABNORMAL

## 2020-10-26 ENCOUNTER — CLINICAL SUPPORT (OUTPATIENT)
Dept: REHABILITATION | Facility: HOSPITAL | Age: 60
End: 2020-10-26
Payer: MEDICAID

## 2020-10-26 DIAGNOSIS — R29.898 DECREASED STRENGTH OF LOWER EXTREMITY: ICD-10-CM

## 2020-10-26 DIAGNOSIS — R26.89 GAIT, ANTALGIC: ICD-10-CM

## 2020-10-26 DIAGNOSIS — M25.669 DECREASED RANGE OF MOTION OF LOWER EXTREMITY, UNSPECIFIED LATERALITY: ICD-10-CM

## 2020-10-26 PROCEDURE — 97161 PT EVAL LOW COMPLEX 20 MIN: CPT | Mod: PN

## 2020-10-26 PROCEDURE — 97110 THERAPEUTIC EXERCISES: CPT | Mod: PN

## 2020-10-26 NOTE — PROGRESS NOTES
Physical Therapy Initial Evaluation     Name: Theresa Benson  Clinic Number: [id number]    Therapy Diagnosis:   Encounter Diagnoses   Name Primary?    Gait, antalgic     Decreased range of motion of lower extremity, unspecified laterality     Decreased strength of lower extremity      Physician: Pily Felix FNP-C    Physician Orders: PT Eval and Treat   Medical Diagnosis from Referral: M17.0 (ICD-10-CM) - Primary osteoarthritis of both knees    M25.519 (ICD-10-CM) - Pain in joint, shoulder region  Evaluation Date: 10/26/2020  Authorization Period Expiration: 09/30/2020  Plan of Care Expiration: 12/31/2020  Visit # / Visits authorized:1/1    Time In: 1056 (late)  Time Out: 1200  Total Billable Time: 52 minutes (10 minutes of ice not billed)    Precautions: Standard    Subjective   Date of onset: ~ 1 year  History of current condition - Theresa reports: 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 However, she has started to have pain within the past year that has been worse. She also pain start in bilateral shoulders and down her arms about a year ago. Left side is worst than the right and she has a pin in her right shoulder after a dislocation. She reports that she broke both of her collar bones in the past. She has significant hx of breaking her arms when she was younger. Her last break in her left arm was in 2018. She has been using RW for about one year but prior to she would use a cane. She reports that she has cyst behind both knees that is more inflamed in the right.      Medical History:   Past Medical History:   Diagnosis Date    Acid reflux     Arthritis     Hypertension     Morbid obesity        Surgical History:   Theresa Benson  has a past surgical history that includes Hysterectomy; Tubal ligation; Hernia repair; and Fracture surgery.    Medications:   Theresa CAMPBELL has a current medication list which includes the  "following prescription(s): amitriptyline, atorvastatin, cetirizine, chlorthalidone, lidocaine, lisinopril, lisinopril, naproxen, ranitidine, and tizanidine.    Allergies:   Review of patient's allergies indicates:  No Known Allergies     Imaging, none:     Prior Therapy: PT for her knees at [facility]  Social History:  lives with their family  Occupation: Care provider 7 days a week; mainly providing companionship and transportation  Prior Level of Function: ambulating with SPC  DME owned/used: SPC, RW  Current Level of Function: ambulating with RW    Pain:  Current 8/10 5/10, worst 10/10 10/10, best 4/10, 4/10  Location: bilateral knee, bilateral shoulders  Description: Aching, Throbbing and Grabbing  Aggravating Factors: Flexing and Lifting  Easing Factors: muscle relaxors, heat, ice    Pts goals: "I want to get back to being able to walk with my cane and not the RW."    Objective       Observation: Rounded shoulders, forward head,   Shruthi's Sign: negative for warmth, redness or tenderness BLE    ROM Shoulder: IMELDA WFL c/ soreness  Range of Motion: Knee   Left Right   Flexion: 90 80   Extension 8 deg knee flexion 10 degree knee flexion     Strength: Knee   Left Right   Quadriceps 4/5 4/5   Hamstrings 5/5 5/5       Strength: Hip   Left Right   Iliopsoas 4/5* 4/5*   Glute Med 4/5 4/5   Hip Ext NP NP   Ankle PF 5/5 5/5   Ankle DF 5/5 5/5   *denotes pain    Special Tests: Knee   Left Right   Forced Flexion + pain + pain   Forced Extension + pain + pain   Lateral Patellar Apprehension - instability - instability   Patellar Grind Test + pain + pain     Joint Mobility: hypomobile patella BLE  Palpation: TTP medial joint line of bilateral knees  Sensation: intact to light touch    Edema:  Left: pitting  Right: pitting      Gait: Marcelino ambulated with rolling walker.  Level of Assistance: independent  Patient displays increased base of support, decreased weight shift, antalgic gait and decrease step length, decrease gait speed, " "forward trunk flexion.     CMS Impairment/Limitation/Restriction for FOTO Knee Survey    Therapist reviewed FOTO scores for Theresa Benson on 10/26/2020.   FOTO documents entered into [employer] - see Media section.    Limitation Score: 48%       Pt/family was provided educational information, including: role of PT, goals for PT, scheduling - pt verbalized understanding. Discussed insurance limitations with pt.     TREATMENT     Treatment Time In: 11:30  Treatment Time Out: 11:50  Total Treatment time separate from Evaluation: 20 minutes    Theresa received therapeutic exercises to develop strength, endurance, ROM, flexibility and posture for 20 minutes including:    Supine calf str c/ strap 30"x3 ea  Quad set 3" hold x15  SLR 10x  Ankle pumps 30x  Heel slides 10x ea    NV: Nustep, Sit<>stand 24" 10x, supine clams, HS str, patellar mobs    Theresa received ice pack to bilateral knees for 10 minutes     Home Exercises and Patient Education Provided    Education provided:   - HEP  -POC     Written Home Exercises Provided: yes.  Exercises were reviewed and Theresa was able to demonstrate them prior to the end of the session.  Theresa demonstrated good  understanding of the education provided.     See EMR under Patient Instructions for exercises provided 10/26/2020.    Assessment     Theresa CAMPBELL is a 60 y.o. female referred to outpatient Physical Therapy with a medical diagnosis of primary osteoarthritis of both knee and pain in joint shoulder region. with signs and symptoms including: increased bilateral knee pain, decreased knee ROM, decreased LE Strength, soft tissue dysfunction, postural imbalance,impaired joint mobility, and decreased tolerance to functional activities. Pt presented to therapy displaying antalgic gait with decreased gait speed while using RW. Pt's signs and symptoms and special test findings are consistent with knee OA. Pt also has developed bilateral shoulder pain with radiating symptoms. However, 2/2 tie " constraints PT addressed pt's primary complaint of knee pain and will reassess shoulder pain at her first follow up with PT. Pt agreed to this plan.  Pt with good motivation to perform physical activity and responds well to cueing.    Pt prognosis is Fair.   Pt will benefit from skilled outpatient Physical Therapy to address the deficits stated above and in the chart below, provide pt/family education, and to maximize pt's level of independence.     Plan of care discussed with patient: Yes  Pt's spiritual, cultural and educational needs considered and patient is agreeable to the plan of care and goals as stated below:     Anticipated Barriers for therapy: none    Medical Necessity is demonstrated by the following  History  Co-morbidities and personal factors that may impact the plan of care Co-morbidities:   high BMI, HTN and arthritis, chronicity of condition    Personal Factors:   no deficits     moderate   Examination  Body Structures and Functions, activity limitations and participation restrictions that may impact the plan of care Body Regions:   lower extremities    Body Systems:    gross symmetry  ROM  strength  balance  gait  transfers  transitions  motor control  motor learning  edema    Participation Restrictions:   ADLs, IADLs, ambulation    Activity limitations:   Learning and applying knowledge  no deficits    General Tasks and Commands  no deficits    Communication  no deficits    Mobility  lifting and carrying objects  walking  moving around using equipment (WC)  using transportation (bus, train, plane, car)  driving (bike, car, motorcycle)    Self care  washing oneself (bathing, drying, washing hands)  caring for body parts (brushing teeth, shaving, grooming)  toileting  looking after one's health    Domestic Life  shopping  cooking  doing house work (cleaning house, washing dishes, laundry)  assisting others    Interactions/Relationships  no deficits    Life Areas  no deficits    Community and  Social Life  community life  recreation and leisure         moderate   Clinical Presentation stable and uncomplicated low   Decision Making/ Complexity Score: low     Pt's spiritual, cultural and educational needs considered and pt agreeable to plan of care and goals as stated below:     Short Term GOALS: 6 weeks. Pt agrees with goals set.  1. Patient demonstrates independence with HEP.   2. Patient demonstrates independence with Postural Awareness.   3. Patient demonstrates independence with body mechanics.   4. Patient will report pain of </=5/10 at worst, on 0-10 pain scale, with all activity  5. Patient demonstrates ability to perform light duties around her home without difficulty  6. Patient demonstrates ability to walk 1 block with LRAD with little difficulty.    Long Term GOALS: 12 weeks. Pt agrees with goals set.  1. Patient demonstrates increased 0 to 100 to improve tolerance to functional activities pain free.   2. Patient demonstrates increased strength BLE's to 4+/5 or greater to improve tolerance to functional activities pain free.   3. Patient demonstrates improved overall function per FOTO Knee Survey to 38% Limitation or less.   4. Patient will report pain of </=3/10 at worst, on 0-10 pain scale, with all activity  5. Patient demonstrates ability to walk 1/2 mile with LRAD with little difficulty      PLAN       Plan of care Certification: 10/26/2020 to 12/31/2020.    Outpatient Physical Therapy 1-2 times weekly for 12 weeks to include the following interventions: Electrical Stimulation TENS, Gait Training, Manual Therapy, Moist Heat/ Ice, Neuromuscular Re-ed, Patient Education, Therapeutic Activites, Therapeutic Exercise and Dry Needling.  Pt may be seen by PTA as part of the rehabilitation team.     Carmen Robison, PT  10/26/2020    I have seen the patient, reviewed the therapist's plan of care, and I agree with the plan of care.      I certify the need for these services furnished under this  plan of treatment and while under my care.     ___________________ ________ Physician/Referring Practitioner            ___________________________ Date of Signature

## 2021-05-18 ENCOUNTER — HOSPITAL ENCOUNTER (EMERGENCY)
Facility: HOSPITAL | Age: 61
Discharge: HOME | End: 2021-05-18
Attending: EMERGENCY MEDICINE
Payer: MEDICAID

## 2021-05-18 VITALS
RESPIRATION RATE: 18 BRPM | HEIGHT: 69 IN | OXYGEN SATURATION: 98 % | WEIGHT: 293 LBS | BODY MASS INDEX: 43.4 KG/M2 | TEMPERATURE: 98 F | SYSTOLIC BLOOD PRESSURE: 174 MMHG | DIASTOLIC BLOOD PRESSURE: 81 MMHG | HEART RATE: 91 BPM

## 2021-05-18 DIAGNOSIS — R60.0 BILATERAL LOWER EXTREMITY EDEMA: Primary | ICD-10-CM

## 2021-05-18 DIAGNOSIS — M79.89 LEG SWELLING: ICD-10-CM

## 2021-05-18 LAB
ALBUMIN SERPL BCP-MCNC: 2.6 G/DL (ref 3.5–5.2)
ALP SERPL-CCNC: 106 U/L (ref 55–135)
ALT SERPL W/O P-5'-P-CCNC: 15 U/L (ref 10–44)
ANION GAP SERPL CALC-SCNC: 6 MMOL/L (ref 8–16)
AST SERPL-CCNC: 12 U/L (ref 10–40)
BASOPHILS # BLD AUTO: 0.02 K/UL (ref 0–0.2)
BASOPHILS NFR BLD: 0.2 % (ref 0–1.9)
BILIRUB SERPL-MCNC: 0.3 MG/DL (ref 0.1–1)
BNP SERPL-MCNC: 30 PG/ML (ref 0–99)
BUN SERPL-MCNC: 23 MG/DL (ref 6–20)
CALCIUM SERPL-MCNC: 9.2 MG/DL (ref 8.7–10.5)
CHLORIDE SERPL-SCNC: 109 MMOL/L (ref 95–110)
CO2 SERPL-SCNC: 25 MMOL/L (ref 23–29)
CREAT SERPL-MCNC: 1.3 MG/DL (ref 0.5–1.4)
DIFFERENTIAL METHOD: ABNORMAL
EOSINOPHIL # BLD AUTO: 0.2 K/UL (ref 0–0.5)
EOSINOPHIL NFR BLD: 2.2 % (ref 0–8)
ERYTHROCYTE [DISTWIDTH] IN BLOOD BY AUTOMATED COUNT: 15.5 % (ref 11.5–14.5)
EST. GFR  (AFRICAN AMERICAN): 52 ML/MIN/1.73 M^2
EST. GFR  (NON AFRICAN AMERICAN): 45 ML/MIN/1.73 M^2
GLUCOSE SERPL-MCNC: 101 MG/DL (ref 70–110)
HCT VFR BLD AUTO: 31.2 % (ref 37–48.5)
HGB BLD-MCNC: 9.9 G/DL (ref 12–16)
IMM GRANULOCYTES # BLD AUTO: 0.03 K/UL (ref 0–0.04)
IMM GRANULOCYTES NFR BLD AUTO: 0.3 % (ref 0–0.5)
LYMPHOCYTES # BLD AUTO: 2.3 K/UL (ref 1–4.8)
LYMPHOCYTES NFR BLD: 26.3 % (ref 18–48)
MCH RBC QN AUTO: 29.3 PG (ref 27–31)
MCHC RBC AUTO-ENTMCNC: 31.7 G/DL (ref 32–36)
MCV RBC AUTO: 92 FL (ref 82–98)
MONOCYTES # BLD AUTO: 0.8 K/UL (ref 0.3–1)
MONOCYTES NFR BLD: 9.2 % (ref 4–15)
NEUTROPHILS # BLD AUTO: 5.4 K/UL (ref 1.8–7.7)
NEUTROPHILS NFR BLD: 61.8 % (ref 38–73)
NRBC BLD-RTO: 0 /100 WBC
PLATELET # BLD AUTO: 366 K/UL (ref 150–450)
PMV BLD AUTO: 9.8 FL (ref 9.2–12.9)
POTASSIUM SERPL-SCNC: 4.9 MMOL/L (ref 3.5–5.1)
PROT SERPL-MCNC: 7.4 G/DL (ref 6–8.4)
RBC # BLD AUTO: 3.38 M/UL (ref 4–5.4)
SODIUM SERPL-SCNC: 140 MMOL/L (ref 136–145)
WBC # BLD AUTO: 8.78 K/UL (ref 3.9–12.7)

## 2021-05-18 PROCEDURE — 80053 COMPREHEN METABOLIC PANEL: CPT | Performed by: EMERGENCY MEDICINE

## 2021-05-18 PROCEDURE — 93010 EKG 12-LEAD: ICD-10-PCS | Mod: ,,, | Performed by: INTERNAL MEDICINE

## 2021-05-18 PROCEDURE — 63600175 PHARM REV CODE 636 W HCPCS: Performed by: EMERGENCY MEDICINE

## 2021-05-18 PROCEDURE — 83880 ASSAY OF NATRIURETIC PEPTIDE: CPT | Performed by: EMERGENCY MEDICINE

## 2021-05-18 PROCEDURE — 25000003 PHARM REV CODE 250: Performed by: EMERGENCY MEDICINE

## 2021-05-18 PROCEDURE — 93005 ELECTROCARDIOGRAM TRACING: CPT

## 2021-05-18 PROCEDURE — 99284 EMERGENCY DEPT VISIT MOD MDM: CPT | Mod: 25

## 2021-05-18 PROCEDURE — 93010 ELECTROCARDIOGRAM REPORT: CPT | Mod: ,,, | Performed by: INTERNAL MEDICINE

## 2021-05-18 PROCEDURE — 85025 COMPLETE CBC W/AUTO DIFF WBC: CPT | Performed by: EMERGENCY MEDICINE

## 2021-05-18 PROCEDURE — 96374 THER/PROPH/DIAG INJ IV PUSH: CPT

## 2021-05-18 RX ORDER — FUROSEMIDE 10 MG/ML
40 INJECTION INTRAMUSCULAR; INTRAVENOUS
Status: COMPLETED | OUTPATIENT
Start: 2021-05-18 | End: 2021-05-18

## 2021-05-18 RX ORDER — HYDROCODONE BITARTRATE AND ACETAMINOPHEN 5; 325 MG/1; MG/1
1 TABLET ORAL
Status: COMPLETED | OUTPATIENT
Start: 2021-05-18 | End: 2021-05-18

## 2021-05-18 RX ADMIN — FUROSEMIDE 40 MG: 10 INJECTION, SOLUTION INTRAVENOUS at 06:05

## 2021-05-18 RX ADMIN — HYDROCODONE BITARTRATE AND ACETAMINOPHEN 1 TABLET: 5; 325 TABLET ORAL at 05:05

## 2021-05-28 ENCOUNTER — HOSPITAL ENCOUNTER (EMERGENCY)
Facility: HOSPITAL | Age: 61
Discharge: HOME | End: 2021-05-28
Attending: EMERGENCY MEDICINE
Payer: MEDICAID

## 2021-05-28 VITALS
HEIGHT: 69 IN | RESPIRATION RATE: 18 BRPM | WEIGHT: 293 LBS | OXYGEN SATURATION: 100 % | TEMPERATURE: 98 F | HEART RATE: 89 BPM | BODY MASS INDEX: 43.4 KG/M2 | DIASTOLIC BLOOD PRESSURE: 70 MMHG | SYSTOLIC BLOOD PRESSURE: 144 MMHG

## 2021-05-28 DIAGNOSIS — R60.0 BILATERAL LOWER EXTREMITY EDEMA: Primary | ICD-10-CM

## 2021-05-28 DIAGNOSIS — M79.89 LEG SWELLING: ICD-10-CM

## 2021-05-28 DIAGNOSIS — E66.01 CLASS 3 SEVERE OBESITY WITH BODY MASS INDEX (BMI) OF 50.0 TO 59.9 IN ADULT, UNSPECIFIED OBESITY TYPE, UNSPECIFIED WHETHER SERIOUS COMORBIDITY PRESENT: ICD-10-CM

## 2021-05-28 LAB
ALBUMIN SERPL BCP-MCNC: 3.1 G/DL (ref 3.5–5.2)
ALP SERPL-CCNC: 134 U/L (ref 55–135)
ALT SERPL W/O P-5'-P-CCNC: 18 U/L (ref 10–44)
ANION GAP SERPL CALC-SCNC: 9 MMOL/L (ref 8–16)
AST SERPL-CCNC: 15 U/L (ref 10–40)
BASOPHILS # BLD AUTO: 0.04 K/UL (ref 0–0.2)
BASOPHILS NFR BLD: 0.4 % (ref 0–1.9)
BILIRUB SERPL-MCNC: 0.5 MG/DL (ref 0.1–1)
BNP SERPL-MCNC: 35 PG/ML (ref 0–99)
BUN SERPL-MCNC: 22 MG/DL (ref 6–20)
CALCIUM SERPL-MCNC: 10.4 MG/DL (ref 8.7–10.5)
CHLORIDE SERPL-SCNC: 105 MMOL/L (ref 95–110)
CO2 SERPL-SCNC: 25 MMOL/L (ref 23–29)
CREAT SERPL-MCNC: 1.4 MG/DL (ref 0.5–1.4)
DIFFERENTIAL METHOD: ABNORMAL
EOSINOPHIL # BLD AUTO: 0.2 K/UL (ref 0–0.5)
EOSINOPHIL NFR BLD: 1.5 % (ref 0–8)
ERYTHROCYTE [DISTWIDTH] IN BLOOD BY AUTOMATED COUNT: 15.2 % (ref 11.5–14.5)
EST. GFR  (AFRICAN AMERICAN): 47 ML/MIN/1.73 M^2
EST. GFR  (NON AFRICAN AMERICAN): 41 ML/MIN/1.73 M^2
GLUCOSE SERPL-MCNC: 104 MG/DL (ref 70–110)
HCT VFR BLD AUTO: 35.6 % (ref 37–48.5)
HGB BLD-MCNC: 11.3 G/DL (ref 12–16)
IMM GRANULOCYTES # BLD AUTO: 0.07 K/UL (ref 0–0.04)
IMM GRANULOCYTES NFR BLD AUTO: 0.7 % (ref 0–0.5)
LYMPHOCYTES # BLD AUTO: 2.6 K/UL (ref 1–4.8)
LYMPHOCYTES NFR BLD: 24.9 % (ref 18–48)
MCH RBC QN AUTO: 29.4 PG (ref 27–31)
MCHC RBC AUTO-ENTMCNC: 31.7 G/DL (ref 32–36)
MCV RBC AUTO: 93 FL (ref 82–98)
MONOCYTES # BLD AUTO: 0.9 K/UL (ref 0.3–1)
MONOCYTES NFR BLD: 8.8 % (ref 4–15)
NEUTROPHILS # BLD AUTO: 6.7 K/UL (ref 1.8–7.7)
NEUTROPHILS NFR BLD: 63.7 % (ref 38–73)
NRBC BLD-RTO: 0 /100 WBC
PLATELET # BLD AUTO: 478 K/UL (ref 150–450)
PMV BLD AUTO: 9.8 FL (ref 9.2–12.9)
POTASSIUM SERPL-SCNC: 4.4 MMOL/L (ref 3.5–5.1)
PROT SERPL-MCNC: 9.1 G/DL (ref 6–8.4)
RBC # BLD AUTO: 3.84 M/UL (ref 4–5.4)
SODIUM SERPL-SCNC: 139 MMOL/L (ref 136–145)
WBC # BLD AUTO: 10.55 K/UL (ref 3.9–12.7)

## 2021-05-28 PROCEDURE — 93010 ELECTROCARDIOGRAM REPORT: CPT | Mod: ,,, | Performed by: INTERNAL MEDICINE

## 2021-05-28 PROCEDURE — 93010 EKG 12-LEAD: ICD-10-PCS | Mod: ,,, | Performed by: INTERNAL MEDICINE

## 2021-05-28 PROCEDURE — 99284 EMERGENCY DEPT VISIT MOD MDM: CPT | Mod: 25

## 2021-05-28 PROCEDURE — 96374 THER/PROPH/DIAG INJ IV PUSH: CPT

## 2021-05-28 PROCEDURE — 85025 COMPLETE CBC W/AUTO DIFF WBC: CPT | Performed by: PHYSICIAN ASSISTANT

## 2021-05-28 PROCEDURE — 63600175 PHARM REV CODE 636 W HCPCS: Performed by: PHYSICIAN ASSISTANT

## 2021-05-28 PROCEDURE — 25000003 PHARM REV CODE 250: Performed by: PHYSICIAN ASSISTANT

## 2021-05-28 PROCEDURE — 80053 COMPREHEN METABOLIC PANEL: CPT | Performed by: PHYSICIAN ASSISTANT

## 2021-05-28 PROCEDURE — 93005 ELECTROCARDIOGRAM TRACING: CPT

## 2021-05-28 PROCEDURE — 83880 ASSAY OF NATRIURETIC PEPTIDE: CPT | Performed by: PHYSICIAN ASSISTANT

## 2021-05-28 RX ORDER — FLUOXETINE 10 MG/1
10 CAPSULE ORAL DAILY
COMMUNITY
Start: 2021-05-19

## 2021-05-28 RX ORDER — HYDROCODONE BITARTRATE AND ACETAMINOPHEN 10; 325 MG/1; MG/1
1 TABLET ORAL
Status: COMPLETED | OUTPATIENT
Start: 2021-05-28 | End: 2021-05-28

## 2021-05-28 RX ORDER — ONDANSETRON HYDROCHLORIDE 4 MG/5ML
8 SOLUTION ORAL ONCE
Status: COMPLETED | OUTPATIENT
Start: 2021-05-28 | End: 2021-05-28

## 2021-05-28 RX ORDER — FUROSEMIDE 10 MG/ML
40 INJECTION INTRAMUSCULAR; INTRAVENOUS
Status: COMPLETED | OUTPATIENT
Start: 2021-05-28 | End: 2021-05-28

## 2021-05-28 RX ORDER — DICLOFENAC SODIUM 10 MG/G
GEL TOPICAL
COMMUNITY
Start: 2021-02-11

## 2021-05-28 RX ADMIN — FUROSEMIDE 40 MG: 10 INJECTION, SOLUTION INTRAVENOUS at 11:05

## 2021-05-28 RX ADMIN — HYDROCODONE BITARTRATE AND ACETAMINOPHEN 1 TABLET: 10; 325 TABLET ORAL at 10:05

## 2021-05-28 RX ADMIN — ONDANSETRON 8 MG: 4 SOLUTION ORAL at 12:05

## 2021-06-02 DIAGNOSIS — R60.9 EDEMA: Primary | ICD-10-CM

## 2021-06-21 ENCOUNTER — HOSPITAL ENCOUNTER (OUTPATIENT)
Dept: CARDIOLOGY | Facility: HOSPITAL | Age: 61
Discharge: HOME | End: 2021-06-21
Attending: NURSE PRACTITIONER
Payer: MEDICAID

## 2021-06-21 VITALS — BODY MASS INDEX: 43.4 KG/M2 | WEIGHT: 293 LBS | HEIGHT: 69 IN

## 2021-06-21 DIAGNOSIS — R60.9 EDEMA: ICD-10-CM

## 2021-06-21 LAB
AORTIC ROOT ANNULUS: 2.38 CM
AORTIC VALVE CUSP SEPERATION: 1.95 CM
AV INDEX (PROSTH): 0.86
AV MEAN GRADIENT: 7 MMHG
AV PEAK GRADIENT: 13 MMHG
AV VALVE AREA: 2.81 CM2
AV VELOCITY RATIO: 0.79
BSA FOR ECHO PROCEDURE: 2.93 M2
CV ECHO LV RWT: 0.45 CM
DOP CALC AO PEAK VEL: 1.8 M/S
DOP CALC AO VTI: 36.29 CM
DOP CALC LVOT AREA: 3.3 CM2
DOP CALC LVOT DIAMETER: 2.04 CM
DOP CALC LVOT PEAK VEL: 1.42 M/S
DOP CALC LVOT STROKE VOLUME: 102.02 CM3
DOP CALCLVOT PEAK VEL VTI: 31.23 CM
ECHO LV POSTERIOR WALL: 1.17 CM (ref 0.6–1.1)
EJECTION FRACTION: 55 %
FRACTIONAL SHORTENING: 28 % (ref 28–44)
INTERVENTRICULAR SEPTUM: 1.38 CM (ref 0.6–1.1)
LEFT ATRIUM SIZE: 4.45 CM
LEFT INTERNAL DIMENSION IN SYSTOLE: 3.74 CM (ref 2.1–4)
LEFT VENTRICLE DIASTOLIC VOLUME INDEX: 47.44 ML/M2
LEFT VENTRICLE DIASTOLIC VOLUME: 129.99 ML
LEFT VENTRICLE MASS INDEX: 99 G/M2
LEFT VENTRICLE SYSTOLIC VOLUME INDEX: 21.8 ML/M2
LEFT VENTRICLE SYSTOLIC VOLUME: 59.82 ML
LEFT VENTRICULAR INTERNAL DIMENSION IN DIASTOLE: 5.21 CM (ref 3.5–6)
LEFT VENTRICULAR MASS: 271.72 G
PISA TR MAX VEL: 1.91 M/S
PV PEAK VELOCITY: 1.74 CM/S
RA MAJOR: 4.42 CM
RA PRESSURE: 15 MMHG
RA WIDTH: 3.8 CM
TR MAX PG: 15 MMHG
TRICUSPID ANNULAR PLANE SYSTOLIC EXCURSION: 2.52 CM
TV REST PULMONARY ARTERY PRESSURE: 30 MMHG

## 2021-06-21 PROCEDURE — 93306 ECHO (CUPID ONLY): ICD-10-PCS | Mod: 26,,, | Performed by: INTERNAL MEDICINE

## 2021-06-21 PROCEDURE — 93306 TTE W/DOPPLER COMPLETE: CPT | Mod: 26,,, | Performed by: INTERNAL MEDICINE

## 2021-06-21 PROCEDURE — 93306 TTE W/DOPPLER COMPLETE: CPT

## 2021-12-13 ENCOUNTER — HOSPITAL ENCOUNTER (EMERGENCY)
Facility: HOSPITAL | Age: 61
Discharge: HOME | End: 2021-12-13
Attending: EMERGENCY MEDICINE
Payer: MEDICAID

## 2021-12-13 VITALS
HEIGHT: 69 IN | DIASTOLIC BLOOD PRESSURE: 70 MMHG | WEIGHT: 293 LBS | HEART RATE: 99 BPM | OXYGEN SATURATION: 96 % | BODY MASS INDEX: 43.4 KG/M2 | TEMPERATURE: 99 F | RESPIRATION RATE: 16 BRPM | SYSTOLIC BLOOD PRESSURE: 116 MMHG

## 2021-12-13 DIAGNOSIS — L03.116 CELLULITIS OF LEFT LEG: Primary | ICD-10-CM

## 2021-12-13 DIAGNOSIS — M79.606 LEG PAIN: ICD-10-CM

## 2021-12-13 LAB
ANION GAP SERPL CALC-SCNC: 7 MMOL/L (ref 8–16)
BASOPHILS # BLD AUTO: 0.03 K/UL (ref 0–0.2)
BASOPHILS NFR BLD: 0.3 % (ref 0–1.9)
BUN SERPL-MCNC: 25 MG/DL (ref 6–20)
CALCIUM SERPL-MCNC: 8.4 MG/DL (ref 8.7–10.5)
CHLORIDE SERPL-SCNC: 107 MMOL/L (ref 95–110)
CO2 SERPL-SCNC: 26 MMOL/L (ref 23–29)
CREAT SERPL-MCNC: 1.9 MG/DL (ref 0.5–1.4)
CRP SERPL-MCNC: 42.6 MG/L (ref 0–8.2)
DIFFERENTIAL METHOD: ABNORMAL
EOSINOPHIL # BLD AUTO: 0.4 K/UL (ref 0–0.5)
EOSINOPHIL NFR BLD: 3.5 % (ref 0–8)
ERYTHROCYTE [DISTWIDTH] IN BLOOD BY AUTOMATED COUNT: 14 % (ref 11.5–14.5)
ERYTHROCYTE [SEDIMENTATION RATE] IN BLOOD BY WESTERGREN METHOD: 93 MM/HR (ref 0–20)
EST. GFR  (AFRICAN AMERICAN): 33 ML/MIN/1.73 M^2
EST. GFR  (NON AFRICAN AMERICAN): 28 ML/MIN/1.73 M^2
GLUCOSE SERPL-MCNC: 122 MG/DL (ref 70–110)
HCT VFR BLD AUTO: 36.7 % (ref 37–48.5)
HGB BLD-MCNC: 11.6 G/DL (ref 12–16)
IMM GRANULOCYTES # BLD AUTO: 0.03 K/UL (ref 0–0.04)
IMM GRANULOCYTES NFR BLD AUTO: 0.3 % (ref 0–0.5)
LYMPHOCYTES # BLD AUTO: 2.9 K/UL (ref 1–4.8)
LYMPHOCYTES NFR BLD: 29.4 % (ref 18–48)
MCH RBC QN AUTO: 30.3 PG (ref 27–31)
MCHC RBC AUTO-ENTMCNC: 31.6 G/DL (ref 32–36)
MCV RBC AUTO: 96 FL (ref 82–98)
MONOCYTES # BLD AUTO: 0.8 K/UL (ref 0.3–1)
MONOCYTES NFR BLD: 7.5 % (ref 4–15)
NEUTROPHILS # BLD AUTO: 5.9 K/UL (ref 1.8–7.7)
NEUTROPHILS NFR BLD: 59 % (ref 38–73)
NRBC BLD-RTO: 0 /100 WBC
PLATELET # BLD AUTO: 468 K/UL (ref 150–450)
PMV BLD AUTO: 10.5 FL (ref 9.2–12.9)
POTASSIUM SERPL-SCNC: 4.1 MMOL/L (ref 3.5–5.1)
RBC # BLD AUTO: 3.83 M/UL (ref 4–5.4)
SODIUM SERPL-SCNC: 140 MMOL/L (ref 136–145)
URATE SERPL-MCNC: 11.2 MG/DL (ref 2.4–5.7)
WBC # BLD AUTO: 9.96 K/UL (ref 3.9–12.7)

## 2021-12-13 PROCEDURE — 96375 TX/PRO/DX INJ NEW DRUG ADDON: CPT

## 2021-12-13 PROCEDURE — 63600175 PHARM REV CODE 636 W HCPCS: Performed by: EMERGENCY MEDICINE

## 2021-12-13 PROCEDURE — 85652 RBC SED RATE AUTOMATED: CPT | Performed by: NURSE PRACTITIONER

## 2021-12-13 PROCEDURE — 86140 C-REACTIVE PROTEIN: CPT | Performed by: EMERGENCY MEDICINE

## 2021-12-13 PROCEDURE — 99285 EMERGENCY DEPT VISIT HI MDM: CPT | Mod: 25

## 2021-12-13 PROCEDURE — 85025 COMPLETE CBC W/AUTO DIFF WBC: CPT | Performed by: NURSE PRACTITIONER

## 2021-12-13 PROCEDURE — 96365 THER/PROPH/DIAG IV INF INIT: CPT

## 2021-12-13 PROCEDURE — 80048 BASIC METABOLIC PNL TOTAL CA: CPT | Performed by: EMERGENCY MEDICINE

## 2021-12-13 PROCEDURE — 25000003 PHARM REV CODE 250: Performed by: EMERGENCY MEDICINE

## 2021-12-13 PROCEDURE — 84550 ASSAY OF BLOOD/URIC ACID: CPT | Performed by: EMERGENCY MEDICINE

## 2021-12-13 RX ORDER — CLINDAMYCIN PHOSPHATE 900 MG/50ML
900 INJECTION, SOLUTION INTRAVENOUS
Status: COMPLETED | OUTPATIENT
Start: 2021-12-13 | End: 2021-12-13

## 2021-12-13 RX ORDER — HYDROCODONE BITARTRATE AND ACETAMINOPHEN 7.5; 325 MG/1; MG/1
1 TABLET ORAL EVERY 4 HOURS PRN
Qty: 18 TABLET | Refills: 0 | Status: SHIPPED | OUTPATIENT
Start: 2021-12-13

## 2021-12-13 RX ORDER — HYDROMORPHONE HYDROCHLORIDE 2 MG/ML
0.5 INJECTION, SOLUTION INTRAMUSCULAR; INTRAVENOUS; SUBCUTANEOUS
Status: COMPLETED | OUTPATIENT
Start: 2021-12-13 | End: 2021-12-13

## 2021-12-13 RX ORDER — CLINDAMYCIN HYDROCHLORIDE 150 MG/1
300 CAPSULE ORAL EVERY 8 HOURS
Qty: 42 CAPSULE | Refills: 0 | Status: SHIPPED | OUTPATIENT
Start: 2021-12-13 | End: 2021-12-20

## 2021-12-13 RX ADMIN — HYDROMORPHONE HYDROCHLORIDE 0.5 MG: 2 INJECTION, SOLUTION INTRAMUSCULAR; INTRAVENOUS; SUBCUTANEOUS at 07:12

## 2021-12-13 RX ADMIN — CLINDAMYCIN PHOSPHATE 900 MG: 900 INJECTION, SOLUTION INTRAVENOUS at 07:12

## 2021-12-29 DIAGNOSIS — M19.91 PRIMARY OSTEOARTHRITIS: Primary | ICD-10-CM

## 2022-09-07 ENCOUNTER — HOSPITAL ENCOUNTER (EMERGENCY)
Facility: HOSPITAL | Age: 62
Discharge: HOME | End: 2022-09-08
Attending: EMERGENCY MEDICINE
Payer: MEDICAID

## 2022-09-07 DIAGNOSIS — R79.89 ELEVATED D-DIMER: ICD-10-CM

## 2022-09-07 DIAGNOSIS — R07.9 CHEST PAIN: ICD-10-CM

## 2022-09-07 DIAGNOSIS — S22.31XA CLOSED FRACTURE OF ONE RIB OF RIGHT SIDE, INITIAL ENCOUNTER: Primary | ICD-10-CM

## 2022-09-07 LAB
ALBUMIN SERPL BCP-MCNC: 2.7 G/DL (ref 3.5–5.2)
ALP SERPL-CCNC: 114 U/L (ref 55–135)
ALT SERPL W/O P-5'-P-CCNC: 11 U/L (ref 10–44)
ANION GAP SERPL CALC-SCNC: 6 MMOL/L (ref 8–16)
AST SERPL-CCNC: 11 U/L (ref 10–40)
BACTERIA #/AREA URNS HPF: ABNORMAL /HPF
BASOPHILS # BLD AUTO: 0.03 K/UL (ref 0–0.2)
BASOPHILS NFR BLD: 0.3 % (ref 0–1.9)
BILIRUB SERPL-MCNC: 0.5 MG/DL (ref 0.1–1)
BILIRUB UR QL STRIP: NEGATIVE
BNP SERPL-MCNC: 59 PG/ML (ref 0–99)
BUN SERPL-MCNC: 17 MG/DL (ref 6–20)
CALCIUM SERPL-MCNC: 9.9 MG/DL (ref 8.7–10.5)
CHLORIDE SERPL-SCNC: 105 MMOL/L (ref 95–110)
CLARITY UR: ABNORMAL
CO2 SERPL-SCNC: 28 MMOL/L (ref 23–29)
COLOR UR: YELLOW
CREAT SERPL-MCNC: 1.3 MG/DL (ref 0.5–1.4)
D DIMER PPP IA.FEU-MCNC: 1.2 MG/L FEU
DIFFERENTIAL METHOD: ABNORMAL
EOSINOPHIL # BLD AUTO: 0.2 K/UL (ref 0–0.5)
EOSINOPHIL NFR BLD: 1.6 % (ref 0–8)
ERYTHROCYTE [DISTWIDTH] IN BLOOD BY AUTOMATED COUNT: 15.2 % (ref 11.5–14.5)
EST. GFR  (NO RACE VARIABLE): 47 ML/MIN/1.73 M^2
GLUCOSE SERPL-MCNC: 116 MG/DL (ref 70–110)
GLUCOSE UR QL STRIP: NEGATIVE
HCT VFR BLD AUTO: 35.2 % (ref 37–48.5)
HGB BLD-MCNC: 11.3 G/DL (ref 12–16)
HGB UR QL STRIP: NEGATIVE
HYALINE CASTS #/AREA URNS LPF: 4 /LPF
IMM GRANULOCYTES # BLD AUTO: 0.09 K/UL (ref 0–0.04)
IMM GRANULOCYTES NFR BLD AUTO: 0.8 % (ref 0–0.5)
KETONES UR QL STRIP: NEGATIVE
LEUKOCYTE ESTERASE UR QL STRIP: ABNORMAL
LYMPHOCYTES # BLD AUTO: 2.7 K/UL (ref 1–4.8)
LYMPHOCYTES NFR BLD: 24 % (ref 18–48)
MCH RBC QN AUTO: 28.8 PG (ref 27–31)
MCHC RBC AUTO-ENTMCNC: 32.1 G/DL (ref 32–36)
MCV RBC AUTO: 90 FL (ref 82–98)
MICROSCOPIC COMMENT: ABNORMAL
MONOCYTES # BLD AUTO: 1.1 K/UL (ref 0.3–1)
MONOCYTES NFR BLD: 9.6 % (ref 4–15)
NEUTROPHILS # BLD AUTO: 7.1 K/UL (ref 1.8–7.7)
NEUTROPHILS NFR BLD: 63.7 % (ref 38–73)
NITRITE UR QL STRIP: NEGATIVE
NRBC BLD-RTO: 0 /100 WBC
PH UR STRIP: 6 [PH] (ref 5–8)
PLATELET # BLD AUTO: 464 K/UL (ref 150–450)
PMV BLD AUTO: 9.6 FL (ref 9.2–12.9)
POTASSIUM SERPL-SCNC: 3.8 MMOL/L (ref 3.5–5.1)
PROT SERPL-MCNC: 8.4 G/DL (ref 6–8.4)
PROT UR QL STRIP: ABNORMAL
RBC # BLD AUTO: 3.93 M/UL (ref 4–5.4)
RBC #/AREA URNS HPF: 1 /HPF (ref 0–4)
SODIUM SERPL-SCNC: 139 MMOL/L (ref 136–145)
SP GR UR STRIP: 1.02 (ref 1–1.03)
SQUAMOUS #/AREA URNS HPF: 18 /HPF
TROPONIN I SERPL DL<=0.01 NG/ML-MCNC: 0.01 NG/ML (ref 0–0.03)
URN SPEC COLLECT METH UR: ABNORMAL
UROBILINOGEN UR STRIP-ACNC: NEGATIVE EU/DL
WBC # BLD AUTO: 11.15 K/UL (ref 3.9–12.7)
WBC #/AREA URNS HPF: 10 /HPF (ref 0–5)

## 2022-09-07 PROCEDURE — 85025 COMPLETE CBC W/AUTO DIFF WBC: CPT | Performed by: EMERGENCY MEDICINE

## 2022-09-07 PROCEDURE — 93010 EKG 12-LEAD: ICD-10-PCS | Mod: ,,, | Performed by: INTERNAL MEDICINE

## 2022-09-07 PROCEDURE — 83880 ASSAY OF NATRIURETIC PEPTIDE: CPT | Performed by: EMERGENCY MEDICINE

## 2022-09-07 PROCEDURE — 84484 ASSAY OF TROPONIN QUANT: CPT | Performed by: EMERGENCY MEDICINE

## 2022-09-07 PROCEDURE — 85379 FIBRIN DEGRADATION QUANT: CPT | Performed by: EMERGENCY MEDICINE

## 2022-09-07 PROCEDURE — 25500020 PHARM REV CODE 255: Performed by: EMERGENCY MEDICINE

## 2022-09-07 PROCEDURE — 99285 EMERGENCY DEPT VISIT HI MDM: CPT | Mod: 25

## 2022-09-07 PROCEDURE — 93005 ELECTROCARDIOGRAM TRACING: CPT

## 2022-09-07 PROCEDURE — 93010 ELECTROCARDIOGRAM REPORT: CPT | Mod: ,,, | Performed by: INTERNAL MEDICINE

## 2022-09-07 PROCEDURE — 81000 URINALYSIS NONAUTO W/SCOPE: CPT | Performed by: EMERGENCY MEDICINE

## 2022-09-07 PROCEDURE — 80053 COMPREHEN METABOLIC PANEL: CPT | Performed by: EMERGENCY MEDICINE

## 2022-09-07 RX ORDER — HYDROCODONE BITARTRATE AND ACETAMINOPHEN 5; 325 MG/1; MG/1
1 TABLET ORAL
Status: COMPLETED | OUTPATIENT
Start: 2022-09-08 | End: 2022-09-08

## 2022-09-07 RX ADMIN — IOHEXOL 100 ML: 350 INJECTION, SOLUTION INTRAVENOUS at 10:09

## 2022-09-07 NOTE — FIRST PROVIDER EVALUATION
Medical screening exam completed.  I have conducted a focused provider triage encounter, findings are as follows:    Brief history of present illness:  2-3 weeks of right flank pain, SOB. Denies fevers, cough. Pain worsening, worse with laying flat at night.    Vitals:    09/07/22 1812   BP: (!) 207/96   BP Location: Left arm   Patient Position: Sitting   Pulse: 99   Resp: (!) 24   Temp: 98.3 °F (36.8 °C)   TempSrc: Oral   SpO2: 99%   Weight: (!) 172.4 kg (380 lb)       Pertinent physical exam:  Physical Exam  Vitals and nursing note reviewed.   Constitutional:       General: She is not in acute distress.     Appearance: Normal appearance. She is normal weight. She is not ill-appearing.   HENT:      Head: Normocephalic.      Nose: Nose normal. No congestion.      Mouth/Throat:      Mouth: Mucous membranes are moist.   Eyes:      General: No scleral icterus.     Pupils: Pupils are equal, round, and reactive to light.   Cardiovascular:      Rate and Rhythm: Normal rate and regular rhythm.   Pulmonary:      Effort: Pulmonary effort is normal. No respiratory distress.      Breath sounds: Normal breath sounds.   Abdominal:      General: There is no distension.      Palpations: Abdomen is soft.      Tenderness: There is no guarding.   Musculoskeletal:         General: No deformity. Normal range of motion.      Cervical back: Normal range of motion.   Skin:     General: Skin is warm and dry.      Findings: No rash.   Neurological:      General: No focal deficit present.      Mental Status: She is alert and oriented to person, place, and time. Mental status is at baseline.   Psychiatric:         Mood and Affect: Mood normal.         Behavior: Behavior normal.         Brief workup plan:  labs, UA, cxr    Preliminary workup initiated; this workup will be continued and followed by the physician or advanced practice provider that is assigned to the patient when roomed.

## 2022-09-08 VITALS
TEMPERATURE: 99 F | SYSTOLIC BLOOD PRESSURE: 149 MMHG | DIASTOLIC BLOOD PRESSURE: 68 MMHG | OXYGEN SATURATION: 99 % | RESPIRATION RATE: 19 BRPM | WEIGHT: 293 LBS | HEART RATE: 86 BPM | BODY MASS INDEX: 56.12 KG/M2

## 2022-09-08 LAB — TROPONIN I SERPL DL<=0.01 NG/ML-MCNC: 0.01 NG/ML (ref 0–0.03)

## 2022-09-08 PROCEDURE — 25000003 PHARM REV CODE 250: Performed by: EMERGENCY MEDICINE

## 2022-09-08 PROCEDURE — 84484 ASSAY OF TROPONIN QUANT: CPT | Performed by: EMERGENCY MEDICINE

## 2022-09-08 RX ORDER — IBUPROFEN 600 MG/1
600 TABLET ORAL EVERY 6 HOURS PRN
Qty: 20 TABLET | Refills: 0 | Status: SHIPPED | OUTPATIENT
Start: 2022-09-08

## 2022-09-08 RX ORDER — LIDOCAINE 50 MG/G
1 PATCH TOPICAL DAILY
Qty: 15 PATCH | Refills: 0 | Status: SHIPPED | OUTPATIENT
Start: 2022-09-08

## 2022-09-08 RX ORDER — HYDROCODONE BITARTRATE AND ACETAMINOPHEN 5; 325 MG/1; MG/1
1 TABLET ORAL EVERY 6 HOURS PRN
Qty: 12 TABLET | Refills: 0 | Status: SHIPPED | OUTPATIENT
Start: 2022-09-08

## 2022-09-08 RX ADMIN — HYDROCODONE BITARTRATE AND ACETAMINOPHEN 1 TABLET: 5; 325 TABLET ORAL at 12:09

## 2022-09-08 NOTE — ED PROVIDER NOTES
[place] of Care Note:    I assumed care of this patient at shift change from Dr. Han pending US lower extremity veins and V/Q scan. Briefly, this is a 62 year old female who presented today with right sided flank pain and shortness of breath. Work up was initiated with labs, CT, d dimer, us, V/Q. Labs and imaging significant for right sided rib fracture.  I reviewed the lab and imaging finding with the patient.  I have ordered incentive spirometry training for the patient.  I gave her verbal instructions on its use.  Will prescribe short course of pain medication, lidocaine patches.  I did review narcotic prescription precautions with the patient.  I advised close follow-up with the primary care physician at the [facility].  Patient states she understands and agrees with plan.    Miya Phipps MD   7:33 AM       Miya Phipps MD  09/08/22 0737

## 2022-09-08 NOTE — ED PROVIDER NOTES
Encounter Date: 9/7/2022    SCRIBE #1 NOTE: I, Nancy Lianna, am scribing for, and in the presence of,  Dustin Hoyos MD.     History     Chief Complaint   Patient presents with    Flank Pain     Sob & right flank to right upper back pain for the past 3 weeks, progressively getting worse     Ms Theresa Benson is a 63 yo F with PMHx of HTN, morbid obesity, presents to the ED with flank pain. She reports pain to her right flank, that starts from the right lateral breast, for the last 3 weeks. She initially suffered a fall and sustained trauma to the region 4 weeks ago. She states her pain worsened today, currently rating 8/10 in severity. Her pain also worsens with movements. She denies radiation of pain to her leg. She has tried icing the region without relief. She denies taking any medications for relief. She also reports dyspnea on exertion, stating she gets short winded from her bed and walking around the house. She denies any chest pain, dysuria, or other associated symptoms. She endorses compliance with her antihypertensives today. No other modifying factors.     Of note, she states she's been having a difficult time, losing her , sister, and best friend in the last year, been trying to cope with depression, and trying not to depend on her children.     The history is provided by the patient.   Review of patient's allergies indicates:  No Known Allergies  Past Medical History:   Diagnosis Date    Acid reflux     Arthritis     Hypertension     Morbid obesity      Past Surgical History:   Procedure Laterality Date    FRACTURE SURGERY      left wrist    HERNIA REPAIR      umbilical    HYSTERECTOMY      TUBAL LIGATION       Family History   Problem Relation Age of Onset    Hypertension Brother      Social History     Tobacco Use    Smoking status: Never    Smokeless tobacco: Never   Substance Use Topics    Alcohol use: No    Drug use: Yes     Types: Marijuana     Comment: occ     Review of  Systems   Respiratory:  Positive for shortness of breath.    Cardiovascular:  Negative for chest pain.   Genitourinary:  Positive for flank pain. Negative for dysuria.   Musculoskeletal:  Positive for arthralgias (R lateral ribs).   All other systems reviewed and are negative.    Physical Exam     Initial Vitals [09/07/22 1812]   BP Pulse Resp Temp SpO2   (!) 207/96 99 (!) 24 98.3 °F (36.8 °C) 99 %      MAP       --         Physical Exam    Nursing note and vitals reviewed.  Constitutional: She appears well-developed and well-nourished. She is not diaphoretic.   Overweight. Body mass index is 56.12 kg/m².   HENT:   Head: Normocephalic and atraumatic.   Eyes: EOM are normal.   Cardiovascular:  Normal rate, regular rhythm and normal heart sounds.           No murmur heard.  Pulmonary/Chest: Breath sounds normal. No respiratory distress.   Abdominal: Abdomen is soft. Bowel sounds are normal. She exhibits no distension and no mass. There is no abdominal tenderness. There is no guarding.   Musculoskeletal:         General: No tenderness or edema. Normal range of motion.      Comments: Right lateral lower rib TTP. Right flank tenderness. NO rashes to the regions. Good perfusion of all extremities. No calf swelling or tenderness.      Neurological: She is oriented to person, place, and time.   Skin: Skin is warm and dry. No rash noted. No erythema.   Psychiatric:   Tearful at times.        ED Course   Procedures  Labs Reviewed   CBC W/ AUTO DIFFERENTIAL - Abnormal; Notable for the following components:       Result Value    RBC 3.93 (*)     Hemoglobin 11.3 (*)     Hematocrit 35.2 (*)     RDW 15.2 (*)     Platelets 464 (*)     Immature Granulocytes 0.8 (*)     Immature Grans (Abs) 0.09 (*)     Mono # 1.1 (*)     All other components within normal limits   COMPREHENSIVE METABOLIC PANEL - Abnormal; Notable for the following components:    Glucose 116 (*)     Albumin 2.7 (*)     Anion Gap 6 (*)     eGFR 47 (*)     All other  components within normal limits   TROPONIN I   B-TYPE NATRIURETIC PEPTIDE   URINALYSIS, REFLEX TO URINE CULTURE     EKG Readings: (Independently Interpreted)   Initial Reading: No STEMI.   Normal sinus rhythm. Occasional PVC's. Normal WA interval.      Imaging Results              X-Ray Chest AP Portable (Final result)  Result time 09/07/22 18:52:47      Final result by Kamla Pfeiffer MD (09/07/22 18:52:47)                   Impression:      Hypoinflated lungs.  No definite acute cardiopulmonary process identified.      Electronically signed by: Kamla Pfeiffer MD  Date:    09/07/2022  Time:    18:52               Narrative:    EXAMINATION:  XR CHEST AP PORTABLE    CLINICAL HISTORY:  Chest Pain;    TECHNIQUE:  Single frontal view of the chest was performed.    COMPARISON:  January 2020.    FINDINGS:  Cardiac silhouette is stable in size.  Lungs are hypoinflated which accentuates pulmonary vascular markings.  No evidence of focal consolidative process, pneumothorax, or large pleural effusion.  No acute osseous abnormality identified.                                       Medications - No data to display  Medical Decision Making:   History:   Old Medical Records: I decided to obtain old medical records.  Independently Interpreted Test(s):   I have ordered and independently interpreted EKG Reading(s) - see prior notes  Clinical Tests:   Lab Tests: Ordered and Reviewed  Radiological Study: Ordered and Reviewed  Medical Tests: Ordered and Reviewed  ED Management:  Ms Benson was stable during her time in the ER.  Is hemodynamically stable.  Labs noted.  No acute lab abnormalities.  She is noted to be hypertensive.  She is afebrile.  Room air sats are 99%.  She is not hypoxic.  Of note, she does report increasing shortness of breath over the last 3 weeks.  She had a fall about 4 weeks ago, and the right flank and right side pain has been since about a week after that.  CT abdomen pelvis without any acute findings.   Subacute 7th posterior rib fracture noted.  Given patient is reporting shortness of breath with minimal activity, D-dimer ordered.  D-dimer is elevated, does requiring further testing to rule out PE.  I have low clinical suspicion for PE given normal vitals, lack of hypoxia.  Patient has had recent dye load.  V/Q scan has been ordered.  Pt signed out to Dr. Collazo to follow up VQ scan. Dustin Hoyos MD, 09/08/2022 12:39 AM              Scribe Attestation:   Scribe #1: I performed the above scribed service and the documentation accurately describes the services I performed. I attest to the accuracy of the note.               Clinical Impression:   Final diagnoses:  [R07.9] Chest pain       I, Dustin Hoyos MD, personally performed the services described in this documentation. All medical record entries made by the scribe were at my direction and in my presence. I have reviewed the chart and agree that the record reflects my personal performance and is accurate and complete.          Dustin Hoyos MD  09/09/22 3938

## 2022-09-08 NOTE — DISCHARGE INSTRUCTIONS
Thank you for coming to our Emergency Department today. It is important to remember that some problems or medical conditions are difficult to diagnose and may not be found during your Emergency Department visit.     Be sure to follow up with your primary care doctor and review all labs/imaging/tests that were performed during your ER visit with them. Some labs/tests may be outside of the normal range and require non-emergent follow-up and further investigation to help diagnose/exclude/prevent complications or other potentially serious medical conditions that were not addressed during your ER visit.    If you do not have a primary care doctor, you may contact the one listed on your discharge paperwork or you may also call the [facility] Appointment Desk at [phone] to schedule an appointment and establish care with one. It is important to your health that you have a primary care doctor.    Please take all medications as directed. All medications may potentially have side-effects and it is impossible to predict which medications may give you side-effects or what side-effects (if any) they will give you.. If you feel that you are having a negative effect or side-effect of any medication you should immediately stop taking them and seek medical attention. If you feel that you are having a life-threatening reaction call 911.    Return to the ER with any questions/concerns, new/concerning symptoms, worsening or failure to improve.     Do not drive, swim, climb to height, take a bath, operate heavy machinery, drink alcohol or take potentially sedating medications, sign any legal documents or make any important decisions for 24 hours if you have received any pain medications, sedatives or mood altering drugs during your ER visit or within 24 hours of taking them if they have been prescribed to you.     You can find additional resources for Dentists, hearing aids, durable medical equipment, low cost pharmacies and  other resources at https://[facility].org    BELOW THIS LINE ONLY APPLIES IF YOU HAVE A COVID TEST PENDING OR IF YOU HAVE BEEN DIAGNOSED WITH COVID:  Please access [facility] to review the results of your test. Until the results of your COVID test return, you should isolate yourself so as not to potentially spread illness to others.   If your COVID test returns positive, you should isolate yourself so as not to spread illness to others. After five full days, if you are feeling better and you have not had fever for 24 hours, you can return to your typical daily activities, but you must wear a mask around others for an additional 5 days.   If your COVID test returns negative and you are either unvaccinated or more than six months out from your two-dose vaccine and are not yet boosted, you should still quarantine for 5 full days followed by strict mask use for an additional 5 full days.   If your COVID test returns negative and you have received your 2-dose initial vaccine as well as a booster, you should continue strict mask use for 10 full days after the exposure.  For all those exposed, best practice includes a test at day 5 after the exposure. This can be a home test or a test through one of the many testing centers throughout our community.   Masking is always advised to limit the spread of COVID. Cdc.gov is an excellent site to obtain the latest up to date recommendations regarding COVID and COVID testing.     CDC Testing and Quarantine Guidelines for patients with exposure to a known-positive COVID-19 person:  A close exposure is defined as anyone who has had an exposure (masked or unmasked) to a known COVID -19 positive person within 6 feet of someone for a cumulative total of 15 minutes or more over a 24-hour period.   Vaccinated and/or if you recently had a positive covid test within 90 days do NOT need to quarantine after contact with someone who had COVID-19 unless you develop symptoms.   Fully vaccinated  people who have not had a positive test within 90 days, should get tested 3-5 days after their exposure, even if they don't have symptoms and wear a mask indoors in public for 14 days following exposure or until their test result is negative.      Unvaccinated and/or NOT had a positive test within 90 days and meet close exposure  You are required by CDC guidelines to quarantine for at least 5 days from time of exposure followed by 5 days of strict masking. It is recommended, but not required to test after 5 days, unless you develop symptoms, in which case you should test at that time.  If you get tested after 5 days and your test is positive, your 5 day period of isolation starts the day of the positive test.    If your exposure does not meet the above definition, you can return to your normal daily activities to include social distancing, wearing a mask and frequent handwashing.      Here is a link to guidance from the CDC:  https://www.cdc.gov/media/releases/2021/s1227-isolation-quarantine-guidance.html      [place] Dept Of Health Testing Sites:  https://ldh.la.gov/page/3934      [facility] website with testing locations and guidance:  https://www.[facility].org/selfcare

## 2023-08-22 ENCOUNTER — HOSPITAL ENCOUNTER (EMERGENCY)
Facility: HOSPITAL | Age: 63
Discharge: HOME | End: 2023-08-23
Attending: EMERGENCY MEDICINE
Payer: MEDICAID

## 2023-08-22 DIAGNOSIS — M10.9 ACUTE GOUT OF LEFT FOOT, UNSPECIFIED CAUSE: Primary | ICD-10-CM

## 2023-08-22 DIAGNOSIS — M79.672 FOOT PAIN, LEFT: ICD-10-CM

## 2023-08-22 PROCEDURE — 99283 EMERGENCY DEPT VISIT LOW MDM: CPT

## 2023-08-22 PROCEDURE — 63600175 PHARM REV CODE 636 W HCPCS: Performed by: PHYSICIAN ASSISTANT

## 2023-08-22 PROCEDURE — 25000003 PHARM REV CODE 250: Performed by: PHYSICIAN ASSISTANT

## 2023-08-22 RX ORDER — TRAMADOL HYDROCHLORIDE 50 MG/1
50 TABLET ORAL
Status: COMPLETED | OUTPATIENT
Start: 2023-08-22 | End: 2023-08-22

## 2023-08-22 RX ORDER — PREDNISONE 20 MG/1
40 TABLET ORAL
Status: COMPLETED | OUTPATIENT
Start: 2023-08-22 | End: 2023-08-22

## 2023-08-22 RX ADMIN — PREDNISONE 40 MG: 20 TABLET ORAL at 11:08

## 2023-08-22 RX ADMIN — TRAMADOL HYDROCHLORIDE 50 MG: 50 TABLET, COATED ORAL at 11:08

## 2023-08-23 VITALS
OXYGEN SATURATION: 100 % | RESPIRATION RATE: 18 BRPM | TEMPERATURE: 98 F | WEIGHT: 293 LBS | DIASTOLIC BLOOD PRESSURE: 64 MMHG | HEIGHT: 69 IN | SYSTOLIC BLOOD PRESSURE: 139 MMHG | BODY MASS INDEX: 43.4 KG/M2 | HEART RATE: 97 BPM

## 2023-08-23 RX ORDER — PREDNISONE 10 MG/1
TABLET ORAL
Qty: 25 TABLET | Refills: 0 | Status: SHIPPED | OUTPATIENT
Start: 2023-08-24 | End: 2023-09-03

## 2023-08-23 RX ORDER — HYDROMORPHONE HYDROCHLORIDE 1 MG/ML
0.5 INJECTION, SOLUTION INTRAMUSCULAR; INTRAVENOUS; SUBCUTANEOUS
Status: DISCONTINUED | OUTPATIENT
Start: 2023-08-23 | End: 2023-08-23 | Stop reason: HOSPADM

## 2023-08-23 NOTE — DISCHARGE INSTRUCTIONS
Please take the prescribed medications according to the directions on the bottle. If your symptoms worsen you may return to the ED for reevaluation. Otherwise, please follow up with your primary care doctor within 1 week.

## 2023-08-23 NOTE — ED TRIAGE NOTES
64 yo female presents to the ED with c/o left/toe foot pain. Pt repots that she began experiencing left foot/toe pain omn Thursday morning; endorses PMH of gout. Pt denies injury to the area, CP, SOB, N/V/D, fever/chills, or other symptoms at this time; rates pain at at 10 on a PRS of 0-10. AAO x 3 upon assessment; no distress noted at this time. Swelling noted to the left foot. Plan of care is ongoing.

## 2023-08-23 NOTE — ED PROVIDER NOTES
Encounter Date: 8/22/2023       History     Chief Complaint   Patient presents with    Foot Pain     L foot pain since last Thursday. Patient was taking a daily med for gout (allopurinol) that was not improving her pain. She called her PCP and he called in the same thing which she did not fill bc it was not helping. L foot swelling. Unable to ambulate due to pain.     63-year-old female with past medical history of gout, arthritis, stage III CKD, and Baker cyst presents to the ED today with chief complaint of gout flare.  Patient reports pain and swelling in her left foot that feels identical to her prior gout flare.  Reports she is prescribed allopurinol daily, which she has been taking, however it is not helping with her pain.  Reports this flare started 5 days ago.  She attempted to see her primary care doctor to get treatment, however was unable to make the appointment due to pain.  She was only been taking allopurinol.  She denies any trauma to her left foot.  Denies history of diabetes.  Reports current pain is rated a 10/10 in severity.    The history is provided by the patient. No  was used.     Review of patient's allergies indicates:  No Known Allergies  Past Medical History:   Diagnosis Date    Acid reflux     Arthritis     Hypertension     Morbid obesity      Past Surgical History:   Procedure Laterality Date    FRACTURE SURGERY      left wrist    HERNIA REPAIR      umbilical    HYSTERECTOMY      TUBAL LIGATION       Family History   Problem Relation Age of Onset    Hypertension Brother      Social History     Tobacco Use    Smoking status: Never    Smokeless tobacco: Never   Substance Use Topics    Alcohol use: No    Drug use: Yes     Types: Marijuana     Comment: occ     Review of Systems   Constitutional:  Negative for chills, fatigue and fever.   HENT:  Negative for congestion, sinus pressure, sneezing and sore throat.    Eyes:  Negative for visual disturbance.   Respiratory:   Negative for cough and shortness of breath.    Cardiovascular:  Negative for chest pain.   Gastrointestinal:  Negative for nausea and vomiting.   Genitourinary:  Negative for dysuria.   Musculoskeletal:  Positive for arthralgias (L foot). Negative for back pain.   Skin:  Negative for rash.   Neurological:  Negative for syncope and weakness.   Hematological:  Does not bruise/bleed easily.       Physical Exam     Initial Vitals [08/22/23 2211]   BP Pulse Resp Temp SpO2   129/83 (!) 112 18 98 °F (36.7 °C) 99 %      MAP       --         Physical Exam    Nursing note and vitals reviewed.  Constitutional: She appears well-developed and well-nourished. She is not diaphoretic. No distress.   HENT:   Head: Normocephalic and atraumatic.   Right Ear: External ear normal.   Left Ear: External ear normal.   Nose: Nose normal.   Eyes: Conjunctivae are normal.   Cardiovascular:  Normal rate, regular rhythm and intact distal pulses.           Pulmonary/Chest: Breath sounds normal. No respiratory distress.   Abdominal: She exhibits no distension.   Musculoskeletal:      Right foot: Normal.      Left foot: Normal range of motion and normal capillary refill. Swelling and tenderness (most prominent at the area marked in the graphic) present. No deformity, Charcot foot or laceration. Normal pulse.        Feet:      Neurological: She is alert and oriented to person, place, and time. GCS score is 15. GCS eye subscore is 4. GCS verbal subscore is 5. GCS motor subscore is 6.   Skin: Skin is warm and dry.   Psychiatric: She has a normal mood and affect. Her behavior is normal.         ED Course   Procedures  Labs Reviewed - No data to display       Imaging Results    None          Medications   HYDROmorphone injection 0.5 mg (0.5 mg Intramuscular Not Given 8/23/23 0015)   predniSONE tablet 40 mg (40 mg Oral Given 8/22/23 2303)   traMADoL tablet 50 mg (50 mg Oral Given 8/22/23 2303)     Medical Decision Making  63-year-old female with past  medical history of gout, arthritis, stage III CKD, and Baker cyst presents to the ED today with chief complaint of gout flare.  Patient reports pain and swelling in her left foot that feels identical to her prior gout flare.  Reports she is prescribed allopurinol daily, which she has been taking, however it is not helping with her pain.  Reports this flare started 5 days ago.  She attempted to see her primary care doctor to get treatment, however was unable to make the appointment due to pain.  She was only been taking allopurinol.  She denies any trauma to her left foot.  Denies history of diabetes.  Reports current pain is rated a 10/10 in severity.  On physical exam patient has swelling and tenderness to the left foot, please see physical exam section for further details.  There is no erythema, warmth, or signs of septic arthritis.  Patient is afebrile.  Initial pulse upon arrival to the ED his tachycardic, however on auscultation patient seems to have a regular rate.  Low concern for fracture at this time due to patient denying any trauma to her foot.  She states symptoms are consistent with prior gout flare.  Discussed with patient that allopurinol his medication for preventing flares, however does not help with acute pain from a gout flare.  I reviewed her prior records which show that she has a history of stage III CKD is currently seeing nephrology, therefore we will avoid NSAIDs at this time.  I also reviewed her recent lab work from 08/10/2023 which showed normal glucose level.  Given this is day 5 of flare, we will treat with prednisone taper at this time.  Will also give her tramadol in the ED for acute pain management.  Upon re-evaluation, patient still experiencing significant pain after taking tramadol.  Therefore she was given 0.5 Dilaudid for additional pain relief.  Patient counseled to follow up with primary care doctor to further discuss chronic gout management.    Amount and/or Complexity of  Data Reviewed  External Data Reviewed: labs and notes.    Risk  Prescription drug management.                               Clinical Impression:   Final diagnoses:  [M79.672] Foot pain, left  [M10.9] Acute gout of left foot, unspecified cause (Primary)        ED Disposition Condition    Discharge Stable          ED Prescriptions       Medication Sig Dispense Start Date End Date Auth. Provider    predniSONE (DELTASONE) 10 MG tablet Take 4 tablets (40 mg total) by mouth once daily for 4 days, THEN 2 tablets (20 mg total) once daily for 3 days, THEN 1 tablet (10 mg total) once daily for 3 days. Take 4 tabs x 4 days, then take 2 tabs x 3 days, then take 1 tab x 3 days.. 25 tablet 8/24/2023 9/3/2023 Amaya Peacock PA-C          Follow-up Information       Follow up With Specialties Details Why Contact Info    Pily Felix FNP-C Family Medicine Schedule an appointment as soon as possible for a visit in 1 week For follow up [address] [facility] [id number] [phone]      [facility] [place] Go to  As needed, If symptoms worsen [address]  [facility] [id number] [phone]             Amaya Peacock PA-C  08/23/23 0033

## (undated) DEVICE — Device

## (undated) DEVICE — GAUZE SPONGE 4X4 12PLY

## (undated) DEVICE — ELECTRODE REM PLYHSV RETURN 9

## (undated) DEVICE — PACK ENDOSCOPY GENERAL

## (undated) DEVICE — BINDER ABDOMINAL 9 46-62

## (undated) DEVICE — SUT STRATAFIX 3-0 30CM

## (undated) DEVICE — KIT FIBRIN SEALANT EVICEL 5 ML

## (undated) DEVICE — SYR ONLY LUER LOCK 20CC

## (undated) DEVICE — SUT CTD VICRYL 3-0 CR/SH

## (undated) DEVICE — SUT CTD VICRYL 4-0 UND PS-1

## (undated) DEVICE — UNDERGLOVES BIOGEL PI SZ 7 LF

## (undated) DEVICE — SUT X425H ETHIBOND 1-0

## (undated) DEVICE — SEE L#120831

## (undated) DEVICE — BLANKET UPPER BODY 78.7X29.9IN

## (undated) DEVICE — SYS CLSR DERMABOND PRINEO 22CM

## (undated) DEVICE — NDL 18GA X1 1/2 REG BEVEL

## (undated) DEVICE — GLOVE SURG BIOGEL LATEX SZ 7.5

## (undated) DEVICE — SUT 3-0 VICRYL / SH (J416)

## (undated) DEVICE — NDL HYPO REG 25G X 1 1/2

## (undated) DEVICE — SYR 10CC LUER LOCK

## (undated) DEVICE — SUT CTD VICRYL 4-0 BR PS-2

## (undated) DEVICE — APPLICATOR CHLORAPREP ORN 26ML

## (undated) DEVICE — UNDERGLOVES BIOGEL PI SIZE 7.5

## (undated) DEVICE — GLOVE BIOGEL 7.5